# Patient Record
Sex: MALE | Race: WHITE | NOT HISPANIC OR LATINO | Employment: FULL TIME | URBAN - METROPOLITAN AREA
[De-identification: names, ages, dates, MRNs, and addresses within clinical notes are randomized per-mention and may not be internally consistent; named-entity substitution may affect disease eponyms.]

---

## 2019-09-08 ENCOUNTER — APPOINTMENT (OUTPATIENT)
Dept: CT IMAGING | Facility: CLINIC | Age: 44
End: 2019-09-08
Attending: EMERGENCY MEDICINE
Payer: COMMERCIAL

## 2019-09-08 ENCOUNTER — HOSPITAL ENCOUNTER (EMERGENCY)
Facility: CLINIC | Age: 44
Discharge: HOME | End: 2019-09-08
Attending: EMERGENCY MEDICINE | Admitting: EMERGENCY MEDICINE
Payer: COMMERCIAL

## 2019-09-08 VITALS
TEMPERATURE: 98.6 F | BODY MASS INDEX: 26.05 KG/M2 | WEIGHT: 182 LBS | HEART RATE: 68 BPM | DIASTOLIC BLOOD PRESSURE: 72 MMHG | RESPIRATION RATE: 14 BRPM | HEIGHT: 70 IN | SYSTOLIC BLOOD PRESSURE: 139 MMHG | OXYGEN SATURATION: 98 %

## 2019-09-08 DIAGNOSIS — N20.1 URETEROLITHIASIS: ICD-10-CM

## 2019-09-08 LAB
ALBUMIN UR-MCNC: 10 MG/DL
APPEARANCE UR: CLEAR
BILIRUB UR QL STRIP: NEGATIVE
CAOX CRY #/AREA URNS HPF: ABNORMAL /HPF
COLOR UR AUTO: YELLOW
GLUCOSE UR STRIP-MCNC: NEGATIVE MG/DL
HGB UR QL STRIP: ABNORMAL
KETONES UR STRIP-MCNC: 5 MG/DL
LEUKOCYTE ESTERASE UR QL STRIP: NEGATIVE
MUCOUS THREADS #/AREA URNS LPF: PRESENT /LPF
NITRATE UR QL: NEGATIVE
PH UR STRIP: 5.5 PH (ref 5–7)
RBC #/AREA URNS AUTO: >182 /HPF (ref 0–2)
SOURCE: ABNORMAL
SP GR UR STRIP: 1.03 (ref 1–1.03)
UROBILINOGEN UR STRIP-MCNC: 2 MG/DL (ref 0–2)
WBC #/AREA URNS AUTO: 2 /HPF (ref 0–5)

## 2019-09-08 PROCEDURE — 25000128 H RX IP 250 OP 636: Performed by: EMERGENCY MEDICINE

## 2019-09-08 PROCEDURE — 74176 CT ABD & PELVIS W/O CONTRAST: CPT

## 2019-09-08 PROCEDURE — 96372 THER/PROPH/DIAG INJ SC/IM: CPT

## 2019-09-08 PROCEDURE — 81001 URINALYSIS AUTO W/SCOPE: CPT | Performed by: EMERGENCY MEDICINE

## 2019-09-08 PROCEDURE — 99285 EMERGENCY DEPT VISIT HI MDM: CPT | Mod: 25

## 2019-09-08 RX ORDER — MORPHINE SULFATE 15 MG/1
15-30 TABLET ORAL EVERY 4 HOURS PRN
Qty: 10 TABLET | Refills: 0 | Status: SHIPPED | OUTPATIENT
Start: 2019-09-08 | End: 2024-02-08

## 2019-09-08 RX ADMIN — HYDROMORPHONE HYDROCHLORIDE 2 MG: 1 INJECTION, SOLUTION INTRAMUSCULAR; INTRAVENOUS; SUBCUTANEOUS at 22:16

## 2019-09-08 ASSESSMENT — ENCOUNTER SYMPTOMS
BACK PAIN: 1
DYSURIA: 1
FLANK PAIN: 1

## 2019-09-08 ASSESSMENT — MIFFLIN-ST. JEOR: SCORE: 1721.8

## 2019-09-08 NOTE — ED AVS SNAPSHOT
Emergency Department  [address] [place] [id number]  Phone:  [phone]  Fax:  [phone]                                    Christiano Dong   MRN: [id number]    Department:   Emergency Department   Date of Visit:  9/8/2019           After Visit Summary Signature Page    I have received my discharge instructions, and my questions have been answered. I have discussed any challenges I see with this plan with the nurse or doctor.    ..........................................................................................................................................  Patient/Patient Representative Signature      ..........................................................................................................................................  Patient Representative Print Name and Relationship to Patient    ..................................................               ................................................  Date                                   Time    ..........................................................................................................................................  Reviewed by Signature/Title    ...................................................              ..............................................  Date                                               Time          22EPIC Rev 08/18

## 2019-09-09 NOTE — ED PROVIDER NOTES
"  History     Chief Complaint:  Flank Pain      HPI   Christiano Dong is a 43 year old male who presents to the ED for evaluation of left sided flank pain. The patient says at around 7 PM he began feeling bloated and passed some gas. He then says he noticed intense pain like someone had hit him on his lower left back. He says that the pain is more steady now. He denies pain with urination. He says that he has never had anything like this. The patient says that laying down helps the pain the most.      Allergies:  No known drug allergies    Medications:    The patient is not currently taking any prescribed medications.    Past Medical History:    History reviewed.  No pertinent past medical history.    Past Surgical History:    History reviewed. No pertinent surgical history.    Family History:    History reviewed. No pertinent family history.     Social History:  Smoking status: Never Smoker  Alcohol use: Yes-occasionally  Marital Status:   [2]       Review of Systems   Genitourinary: Positive for dysuria and flank pain.   Musculoskeletal: Positive for back pain.   All other systems reviewed and are negative.      Physical Exam   First Vitals:  Patient Vitals for the past 24 hrs:   BP Temp Temp src Pulse Resp SpO2 Height Weight   09/08/19 2300 -- -- -- -- -- 98 % -- --   09/08/19 2148 139/72 98.6  F (37  C) Oral 68 14 99 % 1.778 m (5' 10\") 82.6 kg (182 lb)     Physical Exam  Eye:  Pupils are equal, round, and reactive.  Extraocular movements intact.    ENT:  No rhinorrhea.  Moist mucus membranes.  Normal tongue and tonsil.    Cardiac:  Regular rate and rhythm.  No murmurs, gallops, or rubs.    Pulmonary:  Clear to auscultation bilaterally.  No wheezes, rales, or rhonchi.    Abdomen:  Positive bowel sounds.  Abdomen is soft and non-distended, without focal tenderness. Despite complaints of left sided pain, palpation does not exacerbate his symptoms.     Musculoskeletal:  Normal movement of all extremities " without evidence for deficit.    Skin:  Warm and dry without rashes.    Neurologic:  Non-focal exam without asymmetric weakness or numbness.     Psychiatric:  Normal affect with appropriate interaction with examiner.    Emergency Department Course     Imaging:  Radiographic findings were communicated with the patient who voiced understanding of the findings.     Abd/pelvis CT no contrast-Stone Protocol  IMPRESSION: 1. 0.4 cm distal left ureteral calculus at the ureterovesical junction, resulting in mild obstruction. 2. No additional renal or ureteral calculi. Reading per radiology    Laboratory:  UA with micro: Urineketon 5, Urine blood Moderate, Protein Albumin Urine 10, RBC/HPF >182 (H), Mucous Urine Present, Calcium Oxalate Few o/w negative    Interventions:  2216 Dilaudid 2 mg Intramuscular     Emergency Department Course:  Past medical records, nursing notes, and vitals reviewed.  2157: I performed an exam of the patient and obtained history, as documented above.    The patient was sent for a Abd/pelvis CT while in the emergency department, findings above.    2300: I rechecked the patient. Findings and plan explained to the Patient. Patient discharged home with instructions regarding supportive care, medications, and reasons to return. The importance of close follow-up was reviewed.       Impression & Plan      Medical Decision Making:  This previously healthy middle-aged gentleman presents with a classic history and physical for ureterolithiasis.  This is confirmed on CT.  Urinalysis shows no evidence of infection.  He feels improved after the above interventions.  Plan will be for discharge home with pain medication and close outpatient follow-up.  He will return to us for worsening of condition or other emergent concerns.    Diagnosis:    ICD-10-CM    1. Ureterolithiasis N20.1        Disposition:  discharged to home    Marcus Landaverde  9/8/2019    EMERGENCY DEPARTMENT  Scribe Disclosure:  Marcus CATRER am  serving as a scribe at 9:57 PM on 9/8/2019 to document services personally performed by Trierweiler, Chad A, MD based on my observations and the provider's statements to me.        Trierweiler, Chad A, MD  09/09/19 1033

## 2024-01-30 ENCOUNTER — TELEPHONE (OUTPATIENT)
Dept: NEUROLOGY | Facility: CLINIC | Age: 49
End: 2024-01-30
Payer: COMMERCIAL

## 2024-01-30 NOTE — TELEPHONE ENCOUNTER
Writer left a message for the patient to call back to schedule an appointment wit Dr. Duran per Dr. Duran's request.    The patient called back and was scheduled on 2/8/24 at 8 AM with Dr. Duran.

## 2024-01-31 NOTE — TELEPHONE ENCOUNTER
Action 1/31/24 MV 9.38am   Action Taken Called pt and LVM to see if any additional neuro notes or imaging of brain/spine       RECORDS RECEIVED FROM: N/A   REASON FOR VISIT: PD   Date of Appt: 2/8/24   NOTES (FOR ALL VISITS) STATUS DETAILS   OFFICE NOTE from referring provider N/A **VIP REFERRAL FROM DR PAYNE - NO REFERRAL NOTES**   OFFICE NOTE from other specialist Care Everywhere Dr Pradeep Suarez @ [facility]:  1/23/24    Dr Rodger Baer @ [facility]:  10/26/23   MEDICATION LIST Care Everywhere    IMAGING  (FOR ALL VISITS)     MRI (HEAD, NECK, SPINE) N/A    CT (HEAD, NECK, SPINE) N/A

## 2024-02-08 ENCOUNTER — OFFICE VISIT (OUTPATIENT)
Dept: NEUROLOGY | Facility: CLINIC | Age: 49
End: 2024-02-08
Payer: COMMERCIAL

## 2024-02-08 ENCOUNTER — PRE VISIT (OUTPATIENT)
Dept: NEUROLOGY | Facility: CLINIC | Age: 49
End: 2024-02-08

## 2024-02-08 VITALS
DIASTOLIC BLOOD PRESSURE: 79 MMHG | SYSTOLIC BLOOD PRESSURE: 137 MMHG | WEIGHT: 178.8 LBS | BODY MASS INDEX: 25.66 KG/M2 | OXYGEN SATURATION: 99 % | HEART RATE: 80 BPM

## 2024-02-08 DIAGNOSIS — F41.9 ANXIETY: ICD-10-CM

## 2024-02-08 DIAGNOSIS — G20.A1 YOUNG-ONSET PARKINSON'S DISEASE (H): Primary | ICD-10-CM

## 2024-02-08 PROCEDURE — 99205 OFFICE O/P NEW HI 60 MIN: CPT | Performed by: PSYCHIATRY & NEUROLOGY

## 2024-02-08 PROCEDURE — G2211 COMPLEX E/M VISIT ADD ON: HCPCS | Performed by: PSYCHIATRY & NEUROLOGY

## 2024-02-08 PROCEDURE — 99417 PROLNG OP E/M EACH 15 MIN: CPT | Performed by: PSYCHIATRY & NEUROLOGY

## 2024-02-08 RX ORDER — ESCITALOPRAM OXALATE 20 MG/1
20 TABLET ORAL DAILY
COMMUNITY
Start: 2023-10-26

## 2024-02-08 RX ORDER — PRAMIPEXOLE DIHYDROCHLORIDE 0.25 MG/1
0.5 TABLET ORAL 3 TIMES DAILY
Qty: 540 TABLET | Refills: 3 | Status: SHIPPED | OUTPATIENT
Start: 2024-02-08 | End: 2024-03-01

## 2024-02-08 RX ORDER — LORAZEPAM 0.5 MG/1
0.5 TABLET ORAL
COMMUNITY
Start: 2023-10-26

## 2024-02-08 ASSESSMENT — UNIFIED PARKINSONS DISEASE RATING SCALE (UPDRS)
SPONTANEITY_OF_MOVEMENT: (0) NORMAL: NO PROBLEMS.
LEG_AGILITY_LEFT: (0) NORMAL: NO PROBLEMS.
ARISING_CHAIR: (0) NORMAL: NO PROBLEMS. ABLE TO ARISE QUICKLY WITHOUT HESITATION.
TOTAL_SCORE: 0
PRONATION_SUPINATION_LEFT: (2) MILD: ANY OF THE FOLLOWING: A) 3 TO 5 INTERRUPTIONS DURING TAPPING  B) MILD SLOWING  C) THE AMPLITUDE DECREMENTS MIDWAY IN THE 10-MOVEMENT SEQUENCE.
FREEZING_GAIT: (0) NORMAL: NO FREEZING.
AMPLITUDE_RLE: (0) NORMAL: NO TREMOR.
POSTURAL_STABILITY: (0) NORMAL: NO PROBLEMS. RECOVERS WITH ONE OR TWO STEPS.
RIGIDITY_LUE: (2) MILD: RIGIDITY DETECTED WITHOUT THE ACTIVATION MANEUVER. FULL RANGE OF MOTION IS EASILY ACHIEVED.
AXIAL_SCORE: 1
HANDMOVEMENTS_RIGHT: (0) NORMAL: NO PROBLEMS.
HANDMOVEMENTS_LEFT: (2) MILD: ANY OF THE FOLLOWING: A) 3 TO 5 INTERRUPTIONS DURING TAPPING  B) MILD SLOWING  C) THE AMPLITUDE DECREMENTS MIDWAY IN THE 10-MOVEMENT SEQUENCE.
RIGIDITY_RLE: (0) NORMAL: NO RIGIDITY.
TOETAPPING_RIGHT: (0) NORMAL: NO PROBLEMS.
TOTAL_SCORE_LEFT: 13
RIGIDITY_LLE: (1) SLIGHT: RIGIDITY ONLY DETECTED WITH ACTIVATION MANEUVER.
FACIAL_EXPRESSION: (1) SLIGHT: MINIMAL MASKED FACIES MANIFESTED ONLY BY DECREASED FREQUENCY OF BLINKING.
FINGER_TAPPING_RIGHT: (0) NORMAL: NO PROBLEMS.
TOETAPPING_LEFT: (0) NORMAL: NO PROBLEMS.
PRONATION_SUPINATION_RIGHT: (0) NORMAL: NO PROBLEMS.
LEG_AGILITY_RIGHT: (0) NORMAL: NO PROBLEMS.
AMPLITUDE_RUE: (0) NORMAL: NO TREMOR.
SPEECH: (0) NORMAL: NO SPEECH PROBLEMS.
AMPLITUDE_LLE: (1) SLIGHT: < 1 CM IN MAXIMAL AMPLITUDE.
DYSKINESIAS_PRESENT: NO
RIGIDITY_NECK: (0) NORMAL: NO RIGIDITY.
RIGIDITY_RUE: (0) NORMAL: NO RIGIDITY.
AMPLITUDE_LUE: (2) MILD: > 1 CM BUT < 3 CM IN MAXIMAL AMPLITUDE.
MOVEMENTS_INTERFERE_WITH_RATINGS: NO
GAIT: (0) NORMAL: NO PROBLEMS.
TOTAL_SCORE: 16
POSTURE: (0) NORMAL: NO PROBLEMS.
CONSTANCY_TREMOR_ATREST: (2) MILD: TREMOR AT REST IS PRESENT 25-50% OF THE ENTIRE EXAMINATION PERIOD.
PARKINSONS_MEDS: OFF
FINGER_TAPPING_LEFT: (2) MILD: ANY OF THE FOLLOWING: A) 3 TO 5 INTERRUPTIONS DURING TAPPING  B) MILD SLOWING  C) THE AMPLITUDE DECREMENTS MIDWAY IN THE 10-MOVEMENT SEQUENCE.
AMPLITUDE_LIP_JAW: (0) NORMAL: NO TREMOR.

## 2024-02-08 ASSESSMENT — PAIN SCALES - GENERAL: PAINLEVEL: NO PAIN (0)

## 2024-02-08 NOTE — PROGRESS NOTES
"Department of Neurology  Movement Disorders Division   New Patient Visit    Patient: Christiano Dong   MRN: [id number]   : 1975   Date of Visit: 2024      HPI:  Christiano Dong is a 48 year old male with a history of anxiety who presents as a new movement disorders consult for second opinion on recent Parkinson's disease diagnosis.    He saw Dr. Suarez at [facility] 24 who diagnosed him with early Parkinson's disease based on a left-sided resting tremor and bradykinesia. No diagnostics or therapeutics were ordered as the patient \"needed time to process the news.\"    He is accompanied by his sister Daniele in-person and his wife Viki over the phone.    Today, Viki notes Tank developed at least a twitch in his L thumb about 4y ago that has progressed to a more noticeable primarily resting tremor in the L hand and arm. Viki notes that Tank previously endorsed stiffness in the L palm that he massaged prior to the onset of any twitches or tremor. Tank thinks it has been significantly more noticeable in the past 6-12mo. The tremor has caused more social anxiety since his diagnosis, but otherwise does not actively interfere with activities. The tremor is not present at all times. He also feels his L leg became stiff while running ~2y ago, this has worsened in the past few months. He feels his L ring finger has decreased dexterity while typing. He feels his L shoulder is stiffer. They all deny any R-sided symptoms and head/face tremor. Daniele says he has noticed a tremor in his L leg. No toe curling or cramping.    He had a head injury  after a fall, no LOC. He feels he noticed the tremor more after this.     He endorses urinary frequency and urgency for the past few years. Denies constipation.     Sleeps well overall, nocturia 2-3x/night. No dream enactment behavior. Can be lethargic in the morning.    Has been exercising more regularly in the past few months, also made " dietary changes to decrease processed foods, sugars, caffeine, alcohol. Was feeling excellent until the diagnosis; mood has been low. Primarily worried about the new direction his life is taking.    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 Work can be intense, emotionally and mentally. Viki feels he may be more forgetful, but otherwise no cognitive changes.    They all deny changes in his voice. Denies dysphagia.     He has significant medical anxiety, does not like blood and can easily pass out.    He lives with his wife and two kids.     His paternal grandfather, uncle, and cousin were diagnosed with Parkinson's disease. His father has a diagnosis of essential tremor. The cousin was diagnosed at age 38 (not the son of the uncle with PD). Daniele has MS.    ROS:  All others negative except as listed above.    Past Medical History:   Diagnosis Date    Anxiety         No past surgical history on file.     Current Outpatient Medications   Medication Sig Dispense Refill    escitalopram (LEXAPRO) 20 MG tablet Take 20 mg by mouth daily      LORazepam (ATIVAN) 0.5 MG tablet Take 0.5 mg by mouth         No Known Allergies     Family History   Problem Relation Age of Onset    Tremor Father     Multiple Sclerosis Sister     Parkinsonism Paternal Grandfather     Parkinsonism Paternal Uncle     Parkinsonism Cousin         Social History     Socioeconomic History    Marital status:      Spouse name: Not on file    Number of children: Not on file    Years of education: 16    Highest education level: Not on file   Occupational History    Occupation: realestate developer   Tobacco Use    Smoking status: Never    Smokeless tobacco: Not on file   Substance and Sexual Activity    Alcohol use: Yes     Comment: has a glass of wine occassionally    Drug use: Not on file    Sexual activity: Not on file   Other Topics Concern    Not on file   Social History Narrative    Not on file     Social  Determinants of Health     Financial Resource Strain: Not on file   Food Insecurity: Not on file   Transportation Needs: Not on file   Physical Activity: Not on file   Stress: Not on file   Social Connections: Not on file   Interpersonal Safety: Not on file   Housing Stability: Not on file        PHYSICAL EXAM:  /79 (BP Location: Right arm, Patient Position: Sitting, Cuff Size: Adult Regular)   Pulse 80   Wt 81.1 kg (178 lb 12.8 oz)   SpO2 99%   BMI 25.66 kg/m      Mental Status:   Alert and oriented to person, place, time, and situation.  Speech fluent without paraphasic errors. Follows commands briskly.     CN:  II: Pupils equal, round, and reactive to light. VFF.  III, IV, VI: EOM normal range, no nystagmus.  Normal saccades.  V:  Facial sensation intact.  VII: Face symmetric at rest and with activation  VIII: Intact to finger rub bilaterally.  IX, X: Palate rise b/l, uvula midline.  No dysarthria.  XI: Trapezius and SCM 5/5 bilaterally.  XII: Tongue protrudes midline. No fasciculation or atrophy noted.    Motor:    Near-constant resting tremor of L hand, some postural component when bringing a bottle to his mouth.     R/L  Shoulder abd      5/5  Elbow flex  5/5  Elbow ext  5/5  Wrist flex  5/5  Wrist ext  5/5    Hip flex  5/5  Knee flex  5/5  Knee ext  5/5  Dorsiflex  5/5  Plantar flex  5/5    Reflexes:  R/L  Biceps   2+/2+  Triceps  2+/2+  Patellar  2+/2+  Achilles  2+/2+  No clonus.  No frontal release signs.    Sensation:    Intact to LT, vibration, temperature in all extremities.    Coordination:    FTN intact bilaterally.    Gait:    Normal stance and stride, decreased arm swing L>>R. Tandem gait intact.  Romberg negative.        2/8/2024     8:00 AM   UPDRS Motor Scale   Time: 08:45   Medication Off   R Brain DBS: None   L Brain DBS: None   Dyskinesia (LID) No   Did LID interfere No   Speech 0   Facial Expression 1   Rigidity Neck 0   Rigidity RUE 0   Rigidity LUE 2   Rigidity RLE 0   Rigidity  LLE 1   Finger Taps R 0   Finger Taps L 2   Hand Mvt R 0   Hand Mvt L 2   Pron-/Supinate R 0   Pron-/Supinate L 2   Toe Tap R 0   Toe Tap L 0   Leg Agility R 0   Leg Agility L 0   Arise From Chair 0   Gait 0   Gait Freezing 0   Postural Stability 0   Posture 0   Global Spont Mvt 0   Postural Tremor RUE 0   Postural Tremor LUE 1   Kinetic Tremor RUE 0   Kinetic Tremor LUE 0   Rest Tremor RUE 0   Rest Tremor LUE 2   Rest Tremor RLE 0   Rest Tremor LLE 1   Rest Tremor Lip/Jaw 0   Rest Tremor Constancy 2   Total Right 0   Total Left 13   Axial Total 1   Total 16           LABS:  Hemoglobin A1C  <=5.6 % 5.4     TSH, Sensitive  0.30 - 4.50 uIU/mL 0.71       ASSESSMENT/PLAN:  Christiano Dong is a 48 year old male with a history of anxiety who presents as a new movement disorders consult for young-onset Parkinson's disease.    The overall constellation of symptoms and exam findings are indeed supportive of early stages of a probably tremor dominant PD that is currently untreated.    - Reviewed impression and plan with patient and sister who was present in the visit    - We spent a good amount of time going over PD, education on progression, treatment options, importance of exercise, ongoing studies available at the , and resources through [facility]/[employer] he could explore. We also took the time to review all questions and concerns they had available.    - He is left-handed which is also the dominant side for his symptoms. He is very attuned to the occurrence of symptoms and he feels that once he notices them it triggers a cascade of anxiety and feeds back into the tremors.    - Because his symptoms are beyond the tremors, he also has a component of abnormal dexterity and some bradykinesia, on his dominant hand, we decided to choose a dopaminergic agent to start as a pharmacological intervention. We decided to go with pramipexole, to a target dose of 0.5mg TID to start with, after following a slow titration. We discussed  potential side effects and what to do if any. They are interested in [facility] pharmacy follow-up    - We explained the importance of early intervention with rehab, we will do an initial baseline check with all services, referrals were placed today    - He is interested in the exercises studies available, we will get the team in to talk to him about them    - We also brought in one of our PD nurses from the clinic to go over all resources available.    - We plan on regrouping in about 6-8 weeks, sooner if needed, they were encouraged to contact me back should there be questions, concerns, or changes.     I, Louie Fields MD, personally interviewed, examined and evaluated this patient.     The longitudinal plan of care for Tank was addressed during this visit. Due to the added complexity in care, I will continue to support Christiano Dong in the subsequent management of this condition(s) and with the ongoing continuity of care of this condition(s).    Attending attestation: Today a total 120 minutes were spent on this case, with greater than 50% of the time spent in face-to-face, examining, obtaining history, providing education and coordination of care. Additional time was spent in chart review, ancillary data, and documentation as delineated above.

## 2024-02-08 NOTE — PATIENT INSTRUCTIONS
Dear Tank,    After the visit today, we talked about starting a new medication called pramipexole (0.25mg)    Let's try as follows:    Weeks 1 and 2: 1/2 tablet three times a day (breakfast,lunch and dinner)    Weeks 3 and 4: 1 tablet three times a day    Weeks 5 and 6: 1.5 tablets three times a day    Week 7 until follow-up: 2 tablets three times a day

## 2024-02-08 NOTE — LETTER
"2024       RE: Christiano Dong  [address]  [facility] [address]     Dear Colleague,    Thank you for referring your patient, Christiano Dong, to the [facility]. Please see a copy of my visit note below.    Department of Neurology  Movement Disorders Division   New Patient Visit    Patient: Christiano Dong   MRN: [id number]   : 1975   Date of Visit: 2024      HPI:  Christiano Dong is a 48 year old male with a history of anxiety who presents as a new movement disorders consult for second opinion on recent Parkinson's disease diagnosis.    He saw Dr. Suarez at [facility] 24 who diagnosed him with early Parkinson's disease based on a left-sided resting tremor and bradykinesia. No diagnostics or therapeutics were ordered as the patient \"needed time to process the news.\"    He is accompanied by his sister Daniele in-person and his wife Viki over the phone.    Today, Viki notes Tank developed at least a twitch in his L thumb about 4y ago that has progressed to a more noticeable primarily resting tremor in the L hand and arm. Viki notes that Tank previously endorsed stiffness in the L palm that he massaged prior to the onset of any twitches or tremor. Tank thinks it has been significantly more noticeable in the past 6-12mo. The tremor has caused more social anxiety since his diagnosis, but otherwise does not actively interfere with activities. The tremor is not present at all times. He also feels his L leg became stiff while running ~2y ago, this has worsened in the past few months. He feels his L ring finger has decreased dexterity while typing. He feels his L shoulder is stiffer. They all deny any R-sided symptoms and head/face tremor. Daniele says he has noticed a tremor in his L leg. No toe curling or cramping.    He had a head injury  after a fall, " no LOC. He feels he noticed the tremor more after this.     He endorses urinary frequency and urgency for the past few years. Denies constipation.     Sleeps well overall, nocturia 2-3x/night. No dream enactment behavior. Can be lethargic in the morning.    Has been exercising more regularly in the past few months, also made dietary changes to decrease processed foods, sugars, caffeine, alcohol. Was feeling excellent until the diagnosis; mood has been low. Primarily worried about the new direction his life is taking.    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 Work can be intense, emotionally and mentally. Viki feels he may be more forgetful, but otherwise no cognitive changes.    They all deny changes in his voice. Denies dysphagia.     He has significant medical anxiety, does not like blood and can easily pass out.    He lives with his wife and two kids.     His paternal grandfather, uncle, and cousin were diagnosed with Parkinson's disease. His father has a diagnosis of essential tremor. The cousin was diagnosed at age 38 (not the son of the uncle with PD). Daniele has MS.    ROS:  All others negative except as listed above.    Past Medical History:   Diagnosis Date    Anxiety         No past surgical history on file.     Current Outpatient Medications   Medication Sig Dispense Refill    escitalopram (LEXAPRO) 20 MG tablet Take 20 mg by mouth daily      LORazepam (ATIVAN) 0.5 MG tablet Take 0.5 mg by mouth         No Known Allergies     Family History   Problem Relation Age of Onset    Tremor Father     Multiple Sclerosis Sister     Parkinsonism Paternal Grandfather     Parkinsonism Paternal Uncle     Parkinsonism Cousin         Social History     Socioeconomic History    Marital status:      Spouse name: Not on file    Number of children: Not on file    Years of education: 16    Highest education level: Not on file   Occupational History    Occupation: realestate  developer   Tobacco Use    Smoking status: Never    Smokeless tobacco: Not on file   Substance and Sexual Activity    Alcohol use: Yes     Comment: has a glass of wine occassionally    Drug use: Not on file    Sexual activity: Not on file   Other Topics Concern    Not on file   Social History Narrative    Not on file     Social Determinants of Health     Financial Resource Strain: Not on file   Food Insecurity: Not on file   Transportation Needs: Not on file   Physical Activity: Not on file   Stress: Not on file   Social Connections: Not on file   Interpersonal Safety: Not on file   Housing Stability: Not on file        PHYSICAL EXAM:  /79 (BP Location: Right arm, Patient Position: Sitting, Cuff Size: Adult Regular)   Pulse 80   Wt 81.1 kg (178 lb 12.8 oz)   SpO2 99%   BMI 25.66 kg/m      Mental Status:   Alert and oriented to person, place, time, and situation.  Speech fluent without paraphasic errors. Follows commands briskly.     CN:  II: Pupils equal, round, and reactive to light. VFF.  III, IV, VI: EOM normal range, no nystagmus.  Normal saccades.  V:  Facial sensation intact.  VII: Face symmetric at rest and with activation  VIII: Intact to finger rub bilaterally.  IX, X: Palate rise b/l, uvula midline.  No dysarthria.  XI: Trapezius and SCM 5/5 bilaterally.  XII: Tongue protrudes midline. No fasciculation or atrophy noted.    Motor:    Near-constant resting tremor of L hand, some postural component when bringing a bottle to his mouth.     R/L  Shoulder abd      5/5  Elbow flex  5/5  Elbow ext  5/5  Wrist flex  5/5  Wrist ext  5/5    Hip flex  5/5  Knee flex  5/5  Knee ext  5/5  Dorsiflex  5/5  Plantar flex  5/5    Reflexes:  R/L  Biceps   2+/2+  Triceps  2+/2+  Patellar  2+/2+  Achilles  2+/2+  No clonus.  No frontal release signs.    Sensation:    Intact to LT, vibration, temperature in all extremities.    Coordination:    FTN intact bilaterally.    Gait:    Normal stance and stride, decreased arm  swing L>>R. Tandem gait intact.  Romberg negative.        2/8/2024     8:00 AM   UPDRS Motor Scale   Time: 08:45   Medication Off   R Brain DBS: None   L Brain DBS: None   Dyskinesia (LID) No   Did LID interfere No   Speech 0   Facial Expression 1   Rigidity Neck 0   Rigidity RUE 0   Rigidity LUE 2   Rigidity RLE 0   Rigidity LLE 1   Finger Taps R 0   Finger Taps L 2   Hand Mvt R 0   Hand Mvt L 2   Pron-/Supinate R 0   Pron-/Supinate L 2   Toe Tap R 0   Toe Tap L 0   Leg Agility R 0   Leg Agility L 0   Arise From Chair 0   Gait 0   Gait Freezing 0   Postural Stability 0   Posture 0   Global Spont Mvt 0   Postural Tremor RUE 0   Postural Tremor LUE 1   Kinetic Tremor RUE 0   Kinetic Tremor LUE 0   Rest Tremor RUE 0   Rest Tremor LUE 2   Rest Tremor RLE 0   Rest Tremor LLE 1   Rest Tremor Lip/Jaw 0   Rest Tremor Constancy 2   Total Right 0   Total Left 13   Axial Total 1   Total 16           LABS:  Hemoglobin A1C  <=5.6 % 5.4     TSH, Sensitive  0.30 - 4.50 uIU/mL 0.71       ASSESSMENT/PLAN:  Christiano Dong is a 48 year old male with a history of anxiety who presents as a new movement disorders consult for young-onset Parkinson's disease.    The overall constellation of symptoms and exam findings are indeed supportive of early stages of a probably tremor dominant PD that is currently untreated.    - Reviewed impression and plan with patient and sister who was present in the visit    - We spent a good amount of time going over PD, education on progression, treatment options, importance of exercise, ongoing studies available at the , and resources through [facility] he could explore. We also took the time to review all questions and concerns they had available.    - He is left-handed which is also the dominant side for his symptoms. He is very attuned to the occurrence of symptoms and he feels that once he notices them it triggers a cascade of anxiety and feeds back into the tremors.    - Because his symptoms are  beyond the tremors, he also has a component of abnormal dexterity and some bradykinesia, on his dominant hand, we decided to choose a dopaminergic agent to start as a pharmacological intervention. We decided to go with pramipexole, to a target dose of 0.5mg TID to start with, after following a slow titration. We discussed potential side effects and what to do if any. They are interested in MTM pharmacy follow-up    - We explained the importance of early intervention with rehab, we will do an initial baseline check with all services, referrals were placed today    - He is interested in the exercises studies available, we will get the team in to talk to him about them    - We also brought in one of our PD nurses from the clinic to go over all resources available.    - We plan on regrouping in about 6-8 weeks, sooner if needed, they were encouraged to contact me back should there be questions, concerns, or changes.     I, Louie Fields MD, personally interviewed, examined and evaluated this patient.     The longitudinal plan of care for Tank was addressed during this visit. Due to the added complexity in care, I will continue to support Christiano Dong in the subsequent management of this condition(s) and with the ongoing continuity of care of this condition(s).    Attending attestation: Today a total 120 minutes were spent on this case, with greater than 50% of the time spent in face-to-face, examining, obtaining history, providing education and coordination of care. Additional time was spent in chart review, ancillary data, and documentation as delineated above.        Again, thank you for allowing me to participate in the care of your patient.      Sincerely,    Louie Fields MD

## 2024-02-09 ENCOUNTER — TELEPHONE (OUTPATIENT)
Dept: NEUROLOGY | Facility: CLINIC | Age: 49
End: 2024-02-09
Payer: COMMERCIAL

## 2024-02-09 NOTE — TELEPHONE ENCOUNTER
GENETIC COUNSELING-Neurology  Tank was referred due to young onset Parkinson disease. I left a message offering video visit times and asked him to call me back.    Sincere Mcneil MS, [facility]  Certified Genetic Counselor

## 2024-02-15 ENCOUNTER — VIRTUAL VISIT (OUTPATIENT)
Dept: NEUROLOGY | Facility: CLINIC | Age: 49
End: 2024-02-15
Attending: PSYCHIATRY & NEUROLOGY
Payer: COMMERCIAL

## 2024-02-15 DIAGNOSIS — G20.A1 YOUNG-ONSET PARKINSON'S DISEASE (H): Primary | ICD-10-CM

## 2024-02-15 DIAGNOSIS — F41.1 GAD (GENERALIZED ANXIETY DISORDER): ICD-10-CM

## 2024-02-15 NOTE — PATIENT INSTRUCTIONS
"Recommendations from today's MTM visit:                                                    MTM (medication therapy management) is a service provided by a clinical pharmacist designed to help you get the most of out of your medicines.         -No medication changes today.   -Ok to take Vitamin D 1000 units daily if you want. Look for supplements with the indication of GMP (Good Manufacturing Practices). Two brands are OrthoMolecular and Pure Encapsulations, though these are certainly not the only ones.  -I will ask Dr. Duran about prescription for lorazepam prior to upcoming blood draw.     Follow-up: 3/1/24 at 11am    It was great speaking with you today.  I value your experience and would be very thankful for your time in providing feedback in our clinic survey. In the next few days, you may receive an email or text message from [employer] with a link to a survey related to your  clinical pharmacist.\"     To schedule another MTM appointment, please call the clinic directly or you may call the MTM scheduling line at [phone].    My Clinical Pharmacist's contact information:                                                      Please feel free to contact me with any questions or concerns you have.      Michelle Feng, PharmD  Medication Therapy Management Pharmacist  [facility]    "

## 2024-02-15 NOTE — PROGRESS NOTES
Medication Therapy Management (MTM) Encounter    ASSESSMENT:                            Medication Adherence/Access: No issues identified    Parkinson's Disease:   Tolerating medication well thus far. Will plan to continue current dose and follow up in 2 weeks for further titration, if appropriate at that time. If further increase is desired, would consider increasing by 0.25-0.5mg at one dose at a time for slow titration. Discussed watching for changes from his baseline for both improvement and side effects, as he preferred not reviewing specific possible side effects.  Answered questions about nutrition and supplements and noted that there are no specific diets or supplement recommendations for general PD. He already seems to have a healthy, balanced diet with adequate omega 3. No Vitamin D level on file, though could consider a 1000 unit daily supplement if he wanted to take something. Discussed quality supplements and looking for GMP (Good Manufacturing Practices).    Anxiety:    Doing well overall. Would benefit from lorazepam tablet for upcoming blood draw.    PLAN:                            -No medication changes today.   -Ok to take Vitamin D 1000 units daily if you want. Look for supplements with the indication of GMP (Good Manufacturing Practices). Two brands are OrthoMolecular and Pure Encapsulations, though these are certainly not the only ones.  -Will ask Dr. Duran about prescription for lorazepam prior to upcoming blood draw.    Follow-up: 3/1/24    SUBJECTIVE/OBJECTIVE:                          Tank Dong is a 48 year old male called for an initial visit. He was referred to me from Dr. Duran, Neurology.    Reason for visit: med review.    Allergies/ADRs: Reviewed in chart  Past Medical History: Reviewed in chart  Tobacco: He reports that he has never smoked. He does not have any smokeless tobacco history on file.  Alcohol: not currently using    Medication Adherence/Access: no issues  "reported    Parkinson's Disease:   -pramipexole 0.5mg TID (x 6 days)    Patient recently saw Neurology on 2/8, who confirmed recent diagnosis of young-onset Parkinson's Disease.   See that note for full detail, but he does currently notice resting tremor in left arm and hand, though not always present. He does get anxious about it in social situations, which in turn significantly increases the tremor. Also notices some stiffness in left leg when running and also in his left shoulder.  Thus far, he has not noticed any improvement or side effects since starting pramipexole.    Anxiety:    -escitalopram 20mg daily  -lorazepam 0.5mg as needed (before blood draws and other procedures)-no current supply; has taken about one tablet per year    Pt has been taking escitalopram for about 6 years and has been quite helpful. He reported doing quite well, in combination with healthy diet and exercise. He had much worse anxiety after recent PD diagnosis, but is already feeling much better. States that he does tend to get anxious about medical things and prefers to \"just be told what to do\" rather than knowing a lot of information about potential side effects, etc.  He does have upcoming blood draw for genetic testing and wonders about getting a lorazepam tablet.    ----------------    I spent 30 minutes with this patient today. A copy of the visit note was provided to the patient's provider(s).    A summary of these recommendations was sent via clinic portal.    Michelle Feng, PharmD  Medication Therapy Management Pharmacist  [facility] Neu[facility] Clin[facility]    Telemedicine Visit Details  Type of service:  Telephone visit  Start Time:  9:00am  End Time:  9:30am     Medication Therapy Recommendations  No medication therapy recommendations to display   "

## 2024-02-15 NOTE — Clinical Note
Christiano Griffin, Had a very nice visit with this patient today. He's feeling much less anxious with new diagnosis in the last week. I saw in your note that you were starting pramipexole to a goal of 0.5mg TID, though it looks like that was on the Rx, so he started with that dose right away, now about one week ago. He seems to be tolerating well without issue, so I just asked him to continue on that dose for a few weeks longer. He wonders about getting a lorazepam tablet for upcoming blood draw for genetic testing with Sincere Mcneil. He has had that before. Would you be ok prescribing this?  -lobo

## 2024-02-15 NOTE — Clinical Note
2/15/2024       RE: Christiano Dong  [address]  [facility] [address]     Dear Colleague,    Thank you for referring your patient, Christiano Dong, to the [facility] MULTIPLE [facility]EROSIS CLINIC [place] at [facility]. Please see a copy of my visit note below.    Medication Therapy Management (MTM) Encounter    ASSESSMENT:                            Medication Adherence/Access: No issues identified    Parkinson's Disease:   Tolerating medication well thus far. Will plan to continue current dose and follow up in 2 weeks for further titration, if appropriate at that time.   Answered questions about nutrition and supplements and noted that there are no specific diets or supplement recommendations for PD. He already seems to have a healthy, balanced diet with adequate omega 3. No Vitamin D level on file, though could consider a 1000 unit daily supplement if he wanted to take something. Discussed quality supplements and looking for GMP (Good Manufacturing Practices).    Anxiety:    Doing well overall. Would benefit from lorazepam tablet for upcoming blood draw.    PLAN:                            -No medication changes today.   -Ok to take Vitamin D 1000 units daily if you want  -Will ask Dr. Duran about prescription for lorazepam prior to upcoming blood draw.    Follow-up: 3/1/24    SUBJECTIVE/OBJECTIVE:                          Tank Dong is a 48 year old male called for an initial visit. He was referred to me from Dr. Duran, Neurology.    Reason for visit: med review.    Allergies/ADRs: Reviewed in chart  Past Medical History: Reviewed in chart  Tobacco: He reports that he has never smoked. He does not have any smokeless tobacco history on file.  Alcohol: not currently using    Medication Adherence/Access: no issues reported    Parkinson's Disease:   -pramipexole 0.5mg TID (x 6 days)    Patient recently saw Neurology on 2/8, who confirmed  "recent diagnosis of young-onset Parkinson's Disease.   See that note for full detail, but he does currently notice resting tremor in left arm and hand, though not always present. He does get anxious about it in social situations, which in turn significantly increases the tremor. Also notices some stiffness in left leg when running and also in his left shoulder.  Thus far, he has not noticed any improvement or side effects since starting pramipexole.    Anxiety:    -escitalopram 20mg daily  -lorazepam 0.5mg as needed (before blood draws and other procedures)-no current supply; has taken about one tablet per year    Pt has been taking escitalopram for about 6 years and has been quite helpful. He reported doing quite well, in combination with healthy diet and exercise. He had much worse anxiety after recent PD diagnosis, but is already feeling much better. States that he does tend to get anxious about medical things and prefers to \"just be told what to do\" rather than knowing a lot of information about potential side effects, etc.  He does have upcoming blood draw for genetic testing and wonders about getting a lorazepam tablet.    ----------------    I spent 30 minutes with this patient today. A copy of the visit note was provided to the patient's provider(s).    A summary of these recommendations was sent via clinic portal.    Michelle Feng PharmD  Medication Therapy Management Pharmacist  [facility]      Telemedicine Visit Details  Type of service:  Telephone visit  Start Time:  9:00am  End Time:  9:30am     Medication Therapy Recommendations  No medication therapy recommendations to display       Again, thank you for allowing me to participate in the care of your patient.      Sincerely,    Michelle Feng PharmD    "

## 2024-02-19 DIAGNOSIS — F41.1 GAD (GENERALIZED ANXIETY DISORDER): Primary | ICD-10-CM

## 2024-02-19 DIAGNOSIS — G20.A1 YOUNG-ONSET PARKINSON'S DISEASE (H): ICD-10-CM

## 2024-02-19 RX ORDER — LORAZEPAM 0.5 MG/1
0.5 TABLET ORAL ONCE
Qty: 1 TABLET | Refills: 0 | Status: SHIPPED | OUTPATIENT
Start: 2024-02-19 | End: 2024-02-19

## 2024-02-29 NOTE — PROGRESS NOTES
"Medication Therapy Management (MTM) Encounter    ASSESSMENT:                            Medication Adherence/Access: No issues identified    Parkinson's Disease:   Tremor has been better lately, though maybe moreso related to improved anxiety since hearing PD diagnosis about one month ago. Reasonable to continue titrating medication today. He has follow up with Neurology in about 3 weeks.    PLAN:                            -increase pramipexole dose slowly   -Week 1: 0.75mg (3 tabs) in the morning, 0.5mg (2 tabs) in the afternoon, 0.5mg (2 tabs in the evening)   -Week 2: 0.75mg (3 tabs) in the morning, 0.75mg (3 tabs) in the afternoon, 0.5mg (2 tabs in the evening)   -Week 3: 0.75mg (3 tabs) in the morning, 0.75mg (3 tabs) in the afternoon, 0.75mg (3 tabs in the evening)    Follow-up: 2-3 months or sooner if needed  Neurology on 3/25    SUBJECTIVE/OBJECTIVE:                          Tank Dong is a 48 year old male called for a follow-up visit from 2/15/24.       Reason for visit: med check.    Allergies/ADRs: Reviewed in chart  Past Medical History: Reviewed in chart  Tobacco: He reports that he has never smoked. He does not have any smokeless tobacco history on file.  Alcohol: not currently using    Medication Adherence/Access: no issues reported    Parkinson's Disease:   -pramipexole 0.5mg TID     Patient has been taking medication for about a month and is still not sure if medication has made much difference. He thinks it is maybe slightly better, but anxiety had also been very high after learning of the PD diagnosis and \"mental side is calmed down.\" Back to baseline.  Tremor does come and go, maybe present about 50% of the time. Probably most bothersome when typing and when self conscious about it in meetings with people.   He is maybe a little more tired in the afternoons, though not sure med related.  No clear increase in lightheadedness, though did notice some when exercising on one occasion. Doesn't " think related.  Denied any nausea or constipation.     ----------------      I spent 15 minutes with this patient today. All changes were made via collaborative practice agreement with Louie Aguirre. A copy of the visit note was provided to the patient's provider(s).    A summary of these recommendations was sent via [employer].    Michelle Feng, PharmD  Medication Therapy Management Pharmacist  [facility] [place] [facility]      Telemedicine Visit Details  Type of service:  Telephone visit  Start Time:  11:10am  End Time:  11:25am     Medication Therapy Recommendations  Young-onset Parkinson's disease    Current Medication: pramipexole (MIRAPEX) 0.25 MG tablet (Discontinued)   Rationale: Dose too low - Dosage too low - Effectiveness   Recommendation: Increase Dose - per plan   Status: Accepted per CPA

## 2024-03-01 ENCOUNTER — VIRTUAL VISIT (OUTPATIENT)
Dept: NEUROLOGY | Facility: CLINIC | Age: 49
End: 2024-03-01
Attending: PSYCHIATRY & NEUROLOGY
Payer: COMMERCIAL

## 2024-03-01 DIAGNOSIS — G20.A1 YOUNG-ONSET PARKINSON'S DISEASE (H): ICD-10-CM

## 2024-03-01 RX ORDER — PRAMIPEXOLE DIHYDROCHLORIDE 0.25 MG/1
TABLET ORAL
Qty: 270 TABLET | Refills: 3 | Status: SHIPPED | OUTPATIENT
Start: 2024-03-01 | End: 2024-09-01

## 2024-03-01 NOTE — Clinical Note
3/1/2024       RE: Christiano Dong  [address]  [facility] [address]     Dear Colleague,    Thank you for referring your patient, Christiano Dong, to the [facility] [place] [facility]. Please see a copy of my visit note below.    Medication Therapy Management (MTM) Encounter    ASSESSMENT:                            Medication Adherence/Access: {adherencechoices:934061}    ***:   ***    PLAN:                            ***    Follow-up: {followuptest2:879728}    SUBJECTIVE/OBJECTIVE:                          Tank Dong is a 48 year old male { :843983} for {mtmvisit:973048}     Reason for visit: ***.    Allergies/ADRs: {1/2/3/4/5:364698}  Past Medical History: {1/2/3/4/5:795840}  Tobacco: He reports that he has never smoked. He does not have any smokeless tobacco history on file.  Alcohol: {ALCOHOL CONSUMPTION HX:420359}  {Social and Goals:541673}  Medication Adherence/Access: {fumedadherence:366462}    {MTM SUBJECTIVE (Optional):109544}        ***:   ***    Parkinson's Disease:   -pramipexole 0.5mg TID (taking x 3 weeks)     Patient recently saw Neurology on 2/8, who confirmed recent diagnosis of young-onset Parkinson's Disease.   See that note for full detail, but he does currently notice resting tremor in left arm and hand, though not always present. He does get anxious about it in social situations, which in turn significantly increases the tremor. Also notices some stiffness in left leg when running and also in his left shoulder.  Thus far, he has not noticed any improvement or side effects since starting pramipexole.          Tolerating medication well thus far. Will plan to continue current dose and follow up in 2 weeks for further titration, if appropriate at that time. If further increase is desired, would consider increasing by 0.25-0.5mg at one dose at a time for slow titration.     Today's Vitals:  "There were no vitals taken for this visit.  ----------------  {ISSA?:458648}    I spent {mtm total time 3:638688} with this patient today{MTMpartdbillingquestion:357280}. { :424031}. A copy of the visit note was provided to the patient's provider(s).    A summary of these recommendations {GIVEN/NOT GIVEN:914936}.    ***    Telemedicine Visit Details  Type of service:  {telemedvisitmtm:564837::\"Telephone visit\"}  Start Time: {video/phone visit start time:152948}  End Time: {video/phone visit end time:152948}     Medication Therapy Recommendations  No medication therapy recommendations to display     Medication Therapy Management (MTM) Encounter    ASSESSMENT:                            Medication Adherence/Access: No issues identified    Parkinson's Disease:   Tremor has been better lately, though maybe moreso related to improved anxiety since hearing PD diagnosis about one month ago. Reasonable to continue titrating medication today. He has follow up with Neurology in about 3 weeks.    PLAN:                            -increase pramipexole dose slowly   -Week 1: 0.75mg (3 tabs) in the morning, 0.5mg (2 tabs) in the afternoon, 0.5mg (2 tabs in the evening)   -Week 2: 0.75mg (3 tabs) in the morning, 0.75mg (3 tabs) in the afternoon, 0.5mg (2 tabs in the evening)   -Week 3: 0.75mg (3 tabs) in the morning, 0.75mg (3 tabs) in the afternoon, 0.75mg (3 tabs in the evening)    Follow-up: 2-3 months or sooner if needed  Neurology on 3/25    SUBJECTIVE/OBJECTIVE:                          Tank Dong is a 48 year old male called for a follow-up visit from 2/15/24.       Reason for visit: med check.    Allergies/ADRs: Reviewed in chart  Past Medical History: Reviewed in chart  Tobacco: He reports that he has never smoked. He does not have any smokeless tobacco history on file.  Alcohol: not currently using    Medication Adherence/Access: no issues reported    Parkinson's Disease:   -pramipexole 0.5mg TID     Patient has been " "taking medication for about a month and is still not sure if medication has made much difference. He thinks it is maybe slightly better, but anxiety had also been very high after learning of the PD diagnosis and \"mental side is calmed down.\" Back to baseline.  Tremor does come and go, maybe present about 50% of the time. Probably most bothersome when typing and when self conscious about it in meetings with people.   He is maybe a little more tired in the afternoons, though not sure med related.  No clear increase in lightheadedness, though did notice some when exercising on one occasion. Doesn't think related.  Denied any nausea or constipation.     ----------------  {ISSA?:541794}    I spent {[facility] total time 3:893132} with this patient today{MTMpartdbillingquestion:224352}. { :264652}. A copy of the visit note was provided to the patient's provider(s).    A summary of these recommendations {GIVEN/NOT GIVEN:467449}.    ***    Telemedicine Visit Details  Type of service:  {[facility]visit[facility]:321704::\"Telephone visit\"}  Start Time: {video/phone visit start time:152948}  End Time: {video/phone visit end time:152948}     Medication Therapy Recommendations  No medication therapy recommendations to display       Again, thank you for allowing me to participate in the care of your patient.      Sincerely,    Michelle Feng, PharmD    "

## 2024-03-01 NOTE — Clinical Note
He started pramipexole at 0.5mg TID (unsure if titration instructions were missed or what happened), but has tolerated ok. Bumping up slightly on a weekly basis until he sees you in 3 weeks.

## 2024-03-01 NOTE — PATIENT INSTRUCTIONS
"Recommendations from today's MTM visit:                                                    MTM (medication therapy management) is a service provided by a clinical pharmacist designed to help you get the most of out of your medicines.         -increase pramipexole dose slowly   -Week 1: 0.75mg (3 tabs) in the morning, 0.5mg (2 tabs) in the afternoon, 0.5mg (2 tabs in the evening)   -Week 2: 0.75mg (3 tabs) in the morning, 0.75mg (3 tabs) in the afternoon, 0.5mg (2 tabs in the evening)   -Week 3: 0.75mg (3 tabs) in the morning, 0.75mg (3 tabs) in the afternoon, 0.75mg (3 tabs in the evening)    Follow-up: 2-3 months or sooner if needed  Neurology on 3/25    It was great speaking with you today.  I value your experience and would be very thankful for your time in providing feedback in our clinic survey. In the next few days, you may receive an email or text message from [employer] with a link to a survey related to your  clinical pharmacist.\"     To schedule another MTM appointment, please call the clinic directly or you may call the MTM scheduling line at [phone].    My Clinical Pharmacist's contact information:                                                      Please feel free to contact me with any questions or concerns you have.      Michelle Feng, PharmD  Medication Therapy Management Pharmacist  [facility]    "

## 2024-03-04 ENCOUNTER — THERAPY VISIT (OUTPATIENT)
Dept: PHYSICAL THERAPY | Facility: CLINIC | Age: 49
End: 2024-03-04
Attending: INTERNAL MEDICINE
Payer: COMMERCIAL

## 2024-03-04 ENCOUNTER — THERAPY VISIT (OUTPATIENT)
Dept: SPEECH THERAPY | Facility: CLINIC | Age: 49
End: 2024-03-04
Attending: PSYCHIATRY & NEUROLOGY
Payer: COMMERCIAL

## 2024-03-04 ENCOUNTER — THERAPY VISIT (OUTPATIENT)
Dept: OCCUPATIONAL THERAPY | Facility: CLINIC | Age: 49
End: 2024-03-04
Attending: INTERNAL MEDICINE
Payer: COMMERCIAL

## 2024-03-04 DIAGNOSIS — G20.A1 YOUNG-ONSET PARKINSON'S DISEASE (H): Primary | ICD-10-CM

## 2024-03-04 DIAGNOSIS — G20.A1 PRIMARY PARKINSON'S DISEASE (H): Primary | ICD-10-CM

## 2024-03-04 DIAGNOSIS — R49.8 HYPOPHONIA: ICD-10-CM

## 2024-03-04 DIAGNOSIS — R27.8 COORDINATION IMPAIRMENT: ICD-10-CM

## 2024-03-04 DIAGNOSIS — R27.9 INCOORDINATION: ICD-10-CM

## 2024-03-04 DIAGNOSIS — R26.89 IMPAIRMENT OF BALANCE: ICD-10-CM

## 2024-03-04 PROCEDURE — 97535 SELF CARE MNGMENT TRAINING: CPT | Mod: GO

## 2024-03-04 PROCEDURE — 97165 OT EVAL LOW COMPLEX 30 MIN: CPT | Mod: GO

## 2024-03-04 PROCEDURE — 92524 BEHAVRAL QUALIT ANALYS VOICE: CPT | Mod: GN

## 2024-03-04 PROCEDURE — 97161 PT EVAL LOW COMPLEX 20 MIN: CPT | Mod: GP | Performed by: PHYSICAL THERAPIST

## 2024-03-04 PROCEDURE — 97110 THERAPEUTIC EXERCISES: CPT | Mod: GP | Performed by: PHYSICAL THERAPIST

## 2024-03-04 NOTE — PROGRESS NOTES
"SPEECH LANGUAGE PATHOLOGY EVALUATION    [facility]  OUTPATIENT SPEECH LANGUAGE PATHOLOGY EVALUATION    See electronic medical record for Abuse and Falls Screening details.    Subjective      Presenting condition or subjective complaint: Left hand dexterity.  Tremor in left hand.  Glitch/flexibility in left hip  Mr. Alvarado \"Tank\" Letitia is an pleasant 48 year old male who was referred to outpatient speech language pathology for dysphonia, dysphagia, and dysarthria by neurologist. Tank endorsing that sometimes people ask him to repeat what he said previously again. Further endorsed that the typically using his voice~60% of day as he is on the phone a lot for work. Declines running out breath during speech. Declines difficulty taking a deep breath. Declines voice feeling fatigued at the end of the day. Declined notice of voice reducing in loudness. Declines dysarthria. Declines dysphagia. Declines memory difficulty.   Date of onset: 02/08/24    Diagnosis: Parkinson's disease    Date of diagnosis: 1/23/24  Patient's Neurologist: Louie Aguirre MD    Date of last Neurologist visit: 2/8/2024   First movement disorder symptom presentation and Date: began experiencing L hand/thumb tremors about 3-4 years ago  Non-motor symptoms: Lmild fatigue which improves with exercise, anxiety, urinary symptoms.    Time of last PD med: 7:30 Time of next PD med: 1200. On/Off presentation/symptoms: none.   History of PD specific SLP? none  Date of onset: 02/08/24    Relevant medical history: Depression   Active Ambulatory Problems     Diagnosis Date Noted    Young-onset Parkinson's disease 02/08/2024     Resolved Ambulatory Problems     Diagnosis Date Noted    No Resolved Ambulatory Problems     Past Medical History:   Diagnosis Date    Anxiety       Dates & types of surgery: Right achilles almost 20 years ago  No past surgical history on file.   Prior diagnostic imaging/testing results: MRI  "    Prior therapy history for the same diagnosis, illness or injury: No      Living Environment  Social support: With family members   Help at home: None  Equipment owned:       Employment: Yes [employer]   Hobbies/Interests: Running    Patient goals for therapy: No    Pain assessment: Pain denied     Objective   PERSONAL RATING/VOICE USE RATING  Patient description of current voice quality: Pt reports that today is a typical day for his voice and speech symptoms.      ACOUSTIC MEASURES:  Equipment use for testing: Sound level meter at 30 cm distance from microphone to mouth     Maximum sustained phonation:     Prolonged 'ah' at mid pitch (sec): 73.8 dB SPL (g2) for an average of 5.98  seconds with minimal roughness and tremulousness (improves to 80 dB SPL for average of 11.54 seconds w/ cues  to think loud)        Vibratory Function of Vocal Folds Scale Norms: males 20 - 80 yrs: 15 - 25 secs   Average Fundamental Frequency: 160 Hz (centered around D3) with monotone intonation       Reading: paragraph level reading loudness in dB SPL: 75 dB  Discourse: Conversation/Monologue level:     Discourse loudness in dB SPL 73 dB     Stimulability - loudness in dB SPL: 80 dB      PERCEPTUAL EVALUATION  Voice quality:  rare minimal roughness and breathiness  PROBES: Improved voice quality and volume with probe to 'think loud'.   Breath control: Weak - inspirations for speech are shallow with poor respiratory/phonatory coordination   Voice Use/Effort: hypophonic, pt reports minimal vocal effort during phonation  Neuromuscular Control: shaky, mild intermittent tremulousness observed  Speech Intelligibility: Speech is 100% intelligible at the conversation level. Labial/lingual AMRS are WNL. Labial/lingual SMRs are mildly imprecise in articulation.    Assessment & Plan   CLINICAL IMPRESSIONS   Medical Diagnosis: Young-onset Parkinson's disease (G20.A1)  - Primary    Treatment Diagnosis: minimal hypophonia    Impression/Assessment: Pt is a 48 year old male with voice/speech complaints. The following significant findings have been identified: impaired voicing, characterized by reduced volume, breathiness, roughness, monotone intonation, intermittent tremulousness, and weak breath control. Identified deficits interfere with their ability to  maintain vocal loudness and voice quality for functional communication, so that pt is able to be heard and understood by others in daily conversations  as compared to previous level of function.     PLAN OF CARE  Treatment Interventions:  Created cohesive and individualized treatment/POC w/ pt, and interdisplinary team. Pt stated comprehension of evaluation results, recommendations, and POC    Prognosis to achieve stated therapy goals is good   Rehab potential is impacted by: comorbidities    Long Term Goals   SLP Goal 1  Goal Identifier: Breath  Goal Description: Patient will increase vocal loudness to an average SPL of >75 dB at 30 cm during sustained /a/ phonation of at least 15 seconds given min cues in order to increase vocal respiratory support required for functional communication.  Target Date: 06/02/24  SLP Goal 2  Goal Identifier: Loudness  Goal Description: Patient will increase vocal loudness during narrative reading and semi-spontaneous conversation tasks to a minimum of  75 dB at 30 cm given min cues in order to improve vocal loudness for daily communication tasks  Target Date: 06/02/24      Frequency of Treatment: x1/wk for 4 weeks, tapering off to 1-2/xmonth  Duration of Treatment: up to 90 days     Recommended Referrals to Other Professionals: Occupational Therapy, Physical Therapy, pt already seeing OT/PT  Education Assessment:   Learner/Method: Patient;Listening;Demonstration;Pictures/Video  Education Comments: Created cohesive and individualized treatment/POC w/ pt, and interdisplinary team. Pt stated comprehension of evaluation results, recommendations, and  "POC    Risks and benefits of evaluation/treatment have been explained.   Patient/Family/caregiver agrees with Plan of Care.     Evaluation Time:    Voice Minutes (19310): 40     Present: Not applicable     Thank you for referring Christiano \"Tank\" Letitia to outpatient speech therapy at [facility].  Please call [phone] or email  Juany Avila MS, CCC-SLP at [email or web address] with any questions or concerns.      Juany Avila MS, CCC-SLP    Speech-Language Pathologist     "

## 2024-03-04 NOTE — PROGRESS NOTES
OCCUPATIONAL THERAPY EVALUATION  Type of Visit: Evaluation    See electronic medical record for Abuse and Falls Screening details.    Subjective      Presenting condition or subjective complaint: Left hand dexterity.  Tremor in left hand.  Glitch/flexibility in left hip  Date of onset: 02/08/24 (order date)    Relevant medical history: Depression   Dates & types of surgery: Right achilles almost 20 years ago    Prior diagnostic imaging/testing results: MRI     Prior therapy history for the same diagnosis, illness or injury: No      Occupational Profile: Patient is an 48 year old left hand dominant male was referred to outpatient occupational therapy in to setting of recent diagnosis of young-onset Parkinson's disease.    First movement disorder symptom presentation and Date: noticed a twitch in his L thumb about 4y ago that has progressed to a more noticeable primarily resting tremor in the L hand; feels his L leg became stiff while running ~2y ago, this has worsened in the past few months.   Patient's Neurologist: Louie Aguirre MD    Medication for movement management: pramipexole, to a target dose of 0.5mg TID to start with, after following a slow titration per MD notes  Time of last PD med: 730 am Time of next PD med: 12. On/Off presentation/symptoms: none  Motor symptoms: tremor (left), bradykinesia, hyokinesia (decreased arm swing), rigidity, decreased dexterity.   Non-motor symptoms: mild fatigue which improves with exercise, some social anxiety since his diagnosis, urinary frequency and urgency  History of PD specific PT/OT? No.    Current Activity Level:  he has been exercising more regularly in the past few months. Enjoys football with his son  Functional limitations: decreased left hand dexterity due to left hand tremor impacting FMC tasks (ie: he feels his L ring finger has decreased dexterity while typing); writes right handed without notable micrographia; left shoulder tightness which can  impact throwing football with son  Falls history: Fall in March 2023        Prior Level of Function  Transfers: Independent  Ambulation: Independent  ADL: Independent  IADL: Driving, Finances, Housekeeping, Laundry, Meal preparation, Medication management, Work    Living Environment  Social support: With family members ; Lives with his wife and two children ( his daughter freshman and son in 6th grade)  Type of home: House   Stairs to enter the home: Yes 2 Is there a railing: Yes   Ramp: No   Stairs inside the home: Yes 17 Is there a railing: Yes   Help at home: None  Equipment owned:       Employment: Yes [employer]  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   Hobbies/Interests: Running    Patient goals for therapy: No    Pain assessment:  mild stiffness in left shoulder and hand     Objective   Cognitive Status Examination  Orientation: Oriented to person, place and time   Level of Consciousness: Alert  Follows Commands and Answers Questions: 100% of the time  Personal Safety and Judgement: Intact  Memory: Intact  Attention: No deficits identified  Organization/Problem Solving: No deficits identified  Executive Function: No deficits identified    Denies issues with cognition     Trail Making Part B assesses general cognitive function specifically assesses working memory, visual processing,  visuospatial skills, selective and divided attention, processing speed requiring him to sequence ascending number and letters. Pt performs in 43 seconds (norm: 75 seconds)    VISUAL SKILLS  Visual Acuity: Wears glasses  Visual Field: Appears normal  Visual Attention: Appears normal  Oculomotor: WNL    SENSATION: LE Sensation WNL    RANGE OF MOTION: achieves full range of motion for bilateral upper extremity; mild left shoulder rigidity  STRENGTH: UE Strength WFL      Left Right    Strength (lbs) 86 lb 75 lb   Lateral Pinch (lbs)       COORDINATION:   Functional Dexterity Test:   Right  hand: 30 seconds  Left hand: 48 seconds (minimal functional score 34-50 sec)    Nine Hole Peg Test:   Left Hand, Nine Hole Peg Test (sec): 25 seconds (average 14-26)  Right Hand, Nine Hole Peg Test (sec): 20 seconds (average 14-23  Timed Handwriting (or written assessment): Whales live in a blue ocean: 9 seconds; no micrographia at this time    Bradykinesia and Hypokinesia (Combining slowness, hesitancy, decreased arm swing, small amplitude and poverty of movement in general): Minimal - reduced left arm swing, decreased dexterity   Festination: No  Freezing  No   Dyskinesia: No  Posture: mild rounded shoulder posture  Rigidity: mild LUE - shoulder, hand; left hip   Tremor: yes; left hand tremor    BALANCE:  challenges with high level balance activities    The Timed Up & Go Test (TUG) is designed to assess mobility risk of falls in adult populations. Pt was observed ambulating 10 feet, turning, and returning to chair without use of an assistive device. Scores above 12 seconds indicates risk for falls. Pt initiates sit - stand transfer with UE as postural support. Pt displayed decreased left arm swing with mild left hand tremor, normal stride length, initiates turning, no postural instability  Results revealed a score of 4.59 seconds.     The Timed Up & Go: COGNITIVE: Patient was observed to perform dual motor and cognitive task  ambulating 10 ft and counting backwards by 3's from 100. Score: 5.48 seconds. Score >14.7 sec indicates increased risk of falls.     Four Square Step Test is a test of cognitively-demanding dynamic balance assessing the person's ability to step over obstacles forward, sideways, and backwards. Scores > 9.68 seconds suggests increased risk of falls in the parkinson's population. Results reveal a score of 5.56 seconds and able to clear threshold.      FUNCTIONAL MOBILITY  Assistive Device(s): None  Ambulation: Independent - decreased left arm swing and onset of tremor when walking      BED  MOBILITY:  independent; denies challenges with bed transfers, rolling, scooting     TRANSFERS:  Independent  ; denies challenges with transfers     BATHING:  Independent    UPPER BODY DRESSING: Independent; denies challenges with dressing fasteners    LOWER BODY DRESSING: Independent    TOILETING: Independent    GROOMING: Independent    EATING/SELF FEEDING: Independent;  denies challenges loading utensils, food/drink to mouth, or cutting foods    ACTIVITY TOLERANCE: He lives an active lifestyle and goes to the gym daily and has a . He enjoys playing football with his son (throws with left hand). He has noticed benefits of exercise regarding flexibility and energy.     INSTRUMENTAL ACTIVITIES OF DAILY LIVING (IADL):   Meal Planning/Prep: Denies challenges with meal preparation  Home/Financial Management: Denies challenges with household management tasks. Denies challenges carrying/retrieving items  Communication/Computer Use: Denies issues with handwriting at this time (writes right handed). He has noticed some challenges with L right finger when typing with some errors and required to take breaks.   Community Mobility: Drives without concerns  Care of Others: two children     Assessment & Plan   CLINICAL IMPRESSIONS  Medical Diagnosis: Young-onset Parkinson's disease (G20.A1)  - Primary    Treatment Diagnosis: incoordination    Impression/Assessment:  Patient is an 48 year old left hand dominant male was referred to outpatient occupational therapy in to setting of recent diagnosis of young-onset Parkinson's disease.   The following significant findings have been identified:  Impaired coordination, stiffness, challenges with high level balance.  These identified deficits interfere with their ability to perform  work tasks (ie: typing, manipulation of objects) and recreational activities as compared to previous level of function. Patient benefits from skilled outpatient therapy for education regarding sensory/  cognitive/environmental strategies to maximize motor performance, instruction in home exercise program with emphasis on large amplitude movements, fine motor coordination exercises to optimize distal hand control, and daily living modifications to improve distal control minimize impact of tremor on function.    Clinical Decision Making (Complexity):  Assessment of Occupational Performance: 1-3 Performance Deficits  Occupational Performance Limitations: work, typing, manipulation of objects, and recreational activities   Clinical Decision Making (Complexity): Low complexity    PLAN OF CARE  Treatment Interventions:  Interventions: Self-Care/Home Management, Therapeutic Activity, Therapeutic Exercise    Long Term Goals   OT Goal 1  Goal Identifier: Tremor Management  Goal Description: Patient will be educated on daily living modifications to improve distal control and task performance to minimize tremor on function and maximize independence in ADL/IADL's  Target Date: 04/29/24  OT Goal 2  Goal Identifier: FMC  Goal Description: Patient will independently use strategies (BIG movements and hand flicks) to increase coordination to assist with FMC tasks such as typing, texting,  FM ADL's by improved scores on 9HPT by 3 seconds or more and improved scores on Functional Dexterity Test by 10 seconds  Target Date: 04/29/24  OT Goal 3  Goal Identifier: HEP  Goal Description: Patient will demonstrate independence with high amplitude/PWR! HEP to mitigate progression of symptoms (reduce stiffness, improve posture) and optimize automaticity and amplitude of movement to improve ease, efficiency, and safety during ADL/IADL s.  Target Date: 04/29/24      Frequency of Treatment: 1x/week  Duration of Treatment: 8 weeks     Recommended Referrals to Other Professionals:  none  Education Assessment: Learner/Method: Patient;Listening;Demonstration     Risks and benefits of evaluation/treatment have been explained.    Patient/Family/caregiver agrees with Plan of Care.     Evaluation Time:    OT Eval, Low Complexity Minutes (70879): 35    Signing Clinician: Leah Gonzalez, OTR/L, CNS, CSRS

## 2024-03-04 NOTE — PROGRESS NOTES
PHYSICAL THERAPY EVALUATION  Type of Visit: Evaluation    See electronic medical record for Abuse and Falls Screening details.    Subjective       Presenting condition or subjective complaint: Left hand dexterity.  Tremor in left hand.  Glitch/flexibility in left hip  Date of onset: 02/08/24    Diagnosis: Parkinson's disease  Date of diagnosis: 1/23/24  Patient's Neurologist: Louie Aguirre MD  Date of last Neurologist visit: 2/8/2024   First movement disorder symptom presentation and Date: began experiencing L hand/thumb tremors about 3-4 years ago  Non-motor symptoms: Lmild fatigue which improves with exercise, anxiety, urinary symptoms.    Time of last PD med: 7:30 Time of next PD med: 1200. On/Off presentation/symptoms: none.   History of PD specific PT? none  Current Activity Level: running - works out daily.  HIIT 20-30;   3x/wk - squats, resistance.  HIIT 20-30 minutes.  Off days: planks, push ups, squats then on treadmill or ellipical.  -165 bpm for 2.5 minutes interval training.  Works full time and coaches  Chief Mobility Complaint: balance issues during high level activities and experiences intermittent left leg tremors, L hip tightness leading to difficulty with stretching and feels there is a catch, intermittent L side pain located just above iliac crest. He experiences L toe catch while running over 2 miles. Per MD notes  Freezing: none    Relevant medical history: Depression   Dates & types of surgery: Right achilles almost 20 years ago    Prior diagnostic imaging/testing results: MRI     Prior therapy history for the same diagnosis, illness or injury: No      Prior Level of Function  Transfers: Independent  Ambulation: Independent  ADL: Independent  IADL:     Living Environment  Social support: With family members; wife, 13 yo daughter, 10 yo son.   Type of home: House   Stairs to enter the home: Yes 2 Is there a railing: Yes   Ramp: No   Stairs inside the home: Yes  17 Is there a railing: Yes   Help at home: None  Equipment owned:       Employment: Yes [employer]   Hobbies/Interests: Running - currently limited by L hip catch/toe catch issues    Patient goals for therapy: No - learn exercises and ways to manage symptoms and disease progression    Pain assessment: Pain denied     Objective     OBSERVATION  Bradykinesia and Hypokinesia (Combining slowness, hesitency, decreased arm swing, small amplitude and poverty of movement in general): Minimal - reduced arm swing on L  Dyskinesia:No  Posture: Normal     RANGE OF MOTION: LE ROM WFL, however L hip is limited compared to R  STRENGTH: LE Strength WNL    BED MOBILITY: Independent; slightly increased time for rolling onto side    TRANSFERS: Independent    GAIT:   Gait Description:  reciprocal gait, appropriate heel strike and step length    BALANCE:     SPECIAL TESTS    360 degree turn (steps) 6 CW; 6 CCW      With no AD   Timed Up and Go (TUG) - sec 7.62s   With no AD   Cognitive TUG - sec  Motor TUG  Cog/Motor TUG 7.88s        Task: counting backwards by 3s from 100  6.75s        Motor Task: Single hand ball toss (L hand)  9.75s        Motor + Cognitive Task: naming girl's names in alphabetical order and left hand ball toss     5 Times Sit-to-Stand (5TSTS)  9.40s  Age/Gender Norm: 7.6 +/- 1.8 sec   30 sec Chair Stand 20 reps      Comments: slower movements initially  Age/Gender Norm: M60 - 63 y/o: 16     Mini BESTest  26/28   (<22/28 indicates fall risk)  See full MiniBEST for details   Functional Gait Assessment (FGA) TOTAL SCORE: (MAXIMUM SCORE 30): 29  (<22/30 indicates fall risk)   10 Meter Walk Test (Comfortable)  4.44s - 1.35 m/s  # of steps: 9  Age/Gender Norm: 1.434 meters/second   10 Meter Walk Test (Fast)  2.70s  - 2.22 m/s  # of steps: 6   6 Minute Walk Test (6MWT)   NT due to time constraints - will assess next session        Age/Gender Norm: M60 - 63 y/o: 2,022 feet       SENSATION: LE Sensation  WNL      Assessment & Plan   CLINICAL IMPRESSIONS  Medical Diagnosis: Parkinson's disease    Treatment Diagnosis: impairment of balance, coordination   Impression/Assessment: Patient is a 48 year old male who was recently diagnosed with Parkinson's disease and presents with balance/coordination complaints.  The following significant findings have been identified: Impaired balance. These impairments interfere with their ability to perform recreational activities as compared to previous level of function.  He scores slightly below age/gender norms with 5xSTS and gait speed and was challenged by 3 task divided attention activities.  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     Clinical Decision Making (Complexity):  Clinical Presentation: Stable/Uncomplicated  Clinical Presentation Rationale: based on medical and personal factors listed in PT evaluation  Clinical Decision Making (Complexity): Low complexity    PLAN OF CARE  Treatment Interventions:  Interventions: Neuromuscular Re-education, Therapeutic Activity, Therapeutic Exercise, Standardized Testing    Long Term Goals     PT Goal 1  Goal Identifier: HiMAT  Goal Description: Pt will score >49/54 on the HiMAT in order to improve high level balance for community activities and hobbies  Rationale: to maximize safety and independence within the community  Target Date: 06/03/24  PT Goal 2  Goal Identifier: HEP  Goal Description: Pt will be independent with a HEP to self manage symptoms  Target Date: 06/03/24      Frequency of Treatment: 1x/wk  Duration of Treatment: up to 90 days    Recommended Referrals to Other Professionals:   Education Assessment:   Learner/Method: Patient;Pictures/Video;Demonstration;No Barriers to Learning  Education Comments: PT role, POC, HEP    Risks and benefits of evaluation/treatment have been explained.   Patient/Family/caregiver agrees with Plan of Care.      Evaluation Time:     PT Carolann, Low Complexity Minutes (93702): 40       Signing Clinician: Hood Grijalva PT

## 2024-03-05 NOTE — PROGRESS NOTES
Mini-BESTest: Balance Evaluation Systems Test    0232-1514 [facility]iatric [facility] & [facility]. All rights reserved.    ANTICIPATORY BALANCE  1. SIT TO STAND  Instruction:  Cross your arms across your chest. Try not to use your hands unless you must. Do not let your legs lean  against the back of the chair when you stand. Please stand up now.   (2) Normal: Comes to stand without use of hands and stabilizes independently.  (1) Moderate: Comes to stand WITH use of hands on first attempt.  (0) Severe: Unable to stand up from chair without assistance, OR needs several attempts with use of hands.    2. RISE TO TOES  Instruction:  Place your feet shoulder width apart. Place your hands on your hips. Try to rise as high as you can onto your  toes. I will count out loud to 3 seconds. Try to hold this pose for at least 3 seconds. Look straight ahead. Rise now.   (2) Normal: Stable for 3 s with maximum height.  (1) Moderate: Heels up, but not full range (smaller than when holding hands), OR noticeable instability for 3 s.  (0) Severe: < 3 s.    3. STAND ON ONE LEG  Instruction:  Look straight ahead. Keep your hands on your hips. Lift your leg off of the ground behind you without touching or  resting your raised leg upon your other standing leg. Stay standing on one leg as long as you can. Look straight ahead. Lift  now.   Left: Time in Seconds Trial 1:__24s___Trial 2:_____  (2) Normal: 20 s.  (1) Moderate: < 20 s.  (0) Severe: Unable.    Right: Time in Seconds Trial 1:__25s___Trial 2:_____  (2) Normal: 20 s.  (1) Moderate: < 20 s.  (0) Severe: Unable    To score each side separately use the trial with the longest time.  To calculate the sub-score and total score use the side [left or right] with the lowest numerical score [i.e. the worse side].    REACTIVE POSTURAL CONTROL  4. COMPENSATORY STEPPING CORRECTION- FORWARD  Instruction:  Stand with your feet shoulder width apart, arms at your sides. Lean forward against my hands  beyond your  forward limits. When I let go, do whatever is necessary, including taking a step, to avoid a fall.   (2) Normal: Recovers independently with a single, large step (second realignment step is allowed).  (1) Moderate: More than one step used to recover equilibrium.  (0) Severe: No step, OR would fall if not caught, OR falls spontaneously.    5. COMPENSATORY STEPPING CORRECTION- BACKWARD  Instruction:  Stand with your feet shoulder width apart, arms at your sides. Lean backward against my hands beyond your  backward limits. When I let go, do whatever is necessary, including taking a step, to avoid a fall.   (2) Normal: Recovers independently with a single, large step.  (1) Moderate: More than one step used to recover equilibrium.  (0) Severe: No step, OR would fall if not caught, OR falls spontaneously.    6. COMPENSATORY STEPPING CORRECTION- LATERAL  Instruction:  Stand with your feet together, arms down at your sides. Lean into my hand beyond your sideways limit. When I  let go, do whatever is necessary, including taking a step, to avoid a fall.   Left  (2) Normal: Recovers independently with 1 step(crossover or lateral OK). - slight delay in stepping reaction observed  (1) Moderate: Several steps to recover equilibrium.  (0) Severe: Falls, or cannot step.    Right  (2) Normal: Recovers independently with 1 step(crossover or lateral OK). - slight delay in stepping reacting observed  (1) Moderate: Several steps to recover equilibrium.  (0) Severe: Falls, or cannot step.    To calculate the sub-score and total score use the side [left or right] with the lowest numerical score [i.e. the worse side].    SENSORY ORIENTATION  7. STANCE (FEET TOGETHER); EYES OPEN, FIRM SURFACE  Instruction:  Place your hands on your hips. Place your feet together until almost touching. Look straight ahead. Be as stable  and still as possible, until I say stop.   Time in seconds:___>30s_____  (2) Normal: 30 s.  (1) Moderate: < 30  s.  (0) Severe: Unable.    8. STANCE (FEET TOGETHER); EYES CLOSED, FOAM SURFACE  Instruction:  Step onto the foam. Place your hands on your hips. Place your feet together until almost touching. Be as stable  and still as possible, until I say stop. I will start timing when you close your eyes.   Time in seconds:___>30s_____ - postural sway noted  (2) Normal: 30 s.  (1) Moderate: < 30 s.  (0) Severe: Unable.    9. INCLINE- EYES CLOSED  Instruction:  Step onto the incline ramp. Please stand on the incline ramp with your toes toward the top. Place your feet  shoulder width apart and have your arms down at your sides. I will start timing when you close your eyes.   Time in seconds:__>30s______  (2) Normal: Stands independently 30 s and aligns with gravity.  (1) Moderate: Stands independently <30 s OR aligns with surface.  (0) Severe: Unable.    DYNAMIC GAIT  10. CHANGE IN GAIT SPEED  Instruction:  Begin walking at your normal speed, when I tell you  fast , walk as fast as you can. When I say  slow , walk very  slowly.   (2) Normal: Significantly changes walking speed without imbalance.  (1) Moderate: Unable to change walking speed or signs of imbalance.  (0) Severe: Unable to achieve significant change in walking speed AND signs of imbalance.    11. WALK WITH HEAD TURNS - HORIZONTAL  Instruction:  Begin walking at your normal speed, when I say  right , turn your head and look to the right. When I say  left   turn your head and look to the left. Try to keep yourself walking in a straight line.   (2) Normal: performs head turns with no change in gait speed and good balance.  (1) Moderate: performs head turns with reduction in gait speed.  (0) Severe: performs head turns with imbalance.    12. WALK WITH PIVOT TURNS  Instruction:  Begin walking at your normal speed. When I tell you to  turn and stop , turn as quickly as you can, face the  opposite direction, and stop. After the turn, your feet should be close  together.   (2) Normal: Turns with feet close FAST (< 3 steps) with good balance.  (1) Moderate: Turns with feet close SLOW (>4 steps) with good balance.  (0) Severe: Cannot turn with feet close at any speed without imbalance.    13. STEP OVER OBSTACLES  Instruction:  Begin walking at your normal speed. When you get to the box, step over it, not around it and keep walking.   (2) Normal: Able to step over box with minimal change of gait speed and with good balance.  (1) Moderate: Steps over box but touches box OR displays cautious behavior by slowing gait.  (0) Severe: Unable to step over box OR steps around box.    14. TIMED UP & GO WITH DUAL TASK [3 METER WALK]  Instruction TUG:  When I say  Go , stand up from chair, walk at your normal speed across the tape on the floor, turn around,  and come back to sit in the chair.   Instruction TUG with Dual Task:  Count backwards by threes starting at ___. When I say  Go , stand up from chair, walk at  your normal speed across the tape on the floor, turn around, and come back to sit in the chair. Continue counting backwards  the entire time.   TUG: __7.62______seconds; Dual Task TUG: ___7.88_____seconds  (2) Normal: No noticeable change in sitting, standing or walking while backward counting when compared to TUG without  Dual Task.  (1) Moderate: Dual Task affects either counting OR walking (>10%) when compared to the TUG without Dual Task.  (0) Severe: Stops counting while walking OR stops walking while counting.    When scoring item 14, if subject s gait speed slows more than 10% between the TUG without and with a Dual Task the score  should be decreased by a point.    REACTIVE POSTURAL CONTROL SUB SCORE: 6/ 6  ANTICIPATORY SUB SCORE: 6/ 6  SENSORY ORIENTATION SUB SCORE: 6/ 6  DYNAMIC GAIT SUB SCORE: 8/10    TOTAL SCORE: ___26_____/28      Mini-BESTest: The Mini-BESTest assesses reactive postural, anticipatory, sensory orientation, and dynamic gait balance.  Gait assistive  device used: none     Patient Score: 26/28  Scores of <17.5/28 have identified people with chronic stroke that have a history of falling according to Hailey et al 2013.   Scores of <22/28 are predictive of increased falls risk for persons with Parkinsons Disease    Minimally Detectable Change (MDC) for persons with Parkinsons Disease 5.52pts per Lakisha et al 2011.  Minimally Clinically Significant Difference (MCID) for persons with Balance Disorders: 4pts according to Lizeth et al 2013.

## 2024-03-06 ENCOUNTER — TELEPHONE (OUTPATIENT)
Dept: NEUROLOGY | Facility: CLINIC | Age: 49
End: 2024-03-06
Payer: COMMERCIAL

## 2024-03-06 NOTE — TELEPHONE ENCOUNTER
[facility] Call Center    Phone Message    May a detailed message be left on voicemail: yes     Reason for Call: Appointment Intake    Referring Provider Name: Sincere Mcneil    Genetic per referral    Please reach out to Tank to reschedule appointment.  Tank can be reached at:  [phone]      Action Taken: Other: Neurology    Travel Screening: Not Applicable

## 2024-03-07 ENCOUNTER — TELEPHONE (OUTPATIENT)
Dept: NEUROLOGY | Facility: CLINIC | Age: 49
End: 2024-03-07
Payer: COMMERCIAL

## 2024-03-07 NOTE — TELEPHONE ENCOUNTER
GENETIC COUNSELING-Neurology  I received a message that Tank was trying to schedule genetics consult. I had previouslly left him a message on 2/9. I left another message today offering possible times and also sent a mychart message.    Sincere Mcneil MS, [facility] [place]  Certified Genetic Counselor

## 2024-03-07 NOTE — TELEPHONE ENCOUNTER
[facility] Call Center    Phone Message    May a detailed message be left on voicemail: yes     Reason for Call: Appointment Intake    Referring Provider Name: NEETA PAIZ    Diagnosis and/or Symptoms: Young-onset Parkinson's disease.  Genetic testing per referral.    Writer unable to schedule appointment due to protocols directing to send to clinical pools.     Patient can be reached for scheduling at:  [phone]    Action Taken: Other: Neurology    Travel Screening: Not Applicable

## 2024-03-11 ENCOUNTER — TELEPHONE (OUTPATIENT)
Dept: NEUROLOGY | Facility: CLINIC | Age: 49
End: 2024-03-11
Payer: COMMERCIAL

## 2024-03-11 NOTE — TELEPHONE ENCOUNTER
Provider clinic schedule has been adjusted as of the end of March.  A message was left informing patient to arrive at 10:15 for a 10:30 return visit with Dr. Duran on 3-25-24.    Patient advised to call and reschedule if the new clinic time does not work for the patient by calling [phone]

## 2024-03-15 ENCOUNTER — VIRTUAL VISIT (OUTPATIENT)
Dept: NEUROLOGY | Facility: CLINIC | Age: 49
End: 2024-03-15
Payer: COMMERCIAL

## 2024-03-15 ENCOUNTER — THERAPY VISIT (OUTPATIENT)
Dept: OCCUPATIONAL THERAPY | Facility: CLINIC | Age: 49
End: 2024-03-15
Payer: COMMERCIAL

## 2024-03-15 VITALS — WEIGHT: 170 LBS | BODY MASS INDEX: 23.8 KG/M2 | HEIGHT: 71 IN

## 2024-03-15 DIAGNOSIS — R27.9 INCOORDINATION: ICD-10-CM

## 2024-03-15 DIAGNOSIS — G20.A1 YOUNG-ONSET PARKINSON'S DISEASE (H): Primary | ICD-10-CM

## 2024-03-15 DIAGNOSIS — G20.A1 EARLY-ONSET PARKINSON'S DISEASE (H): Primary | ICD-10-CM

## 2024-03-15 PROCEDURE — 97110 THERAPEUTIC EXERCISES: CPT | Mod: GO

## 2024-03-15 PROCEDURE — 96040 PR GENETIC COUNSELING, EACH 30 MIN: CPT | Mod: 95 | Performed by: GENETIC COUNSELOR, MS

## 2024-03-15 ASSESSMENT — PAIN SCALES - GENERAL: PAINLEVEL: NO PAIN (0)

## 2024-03-15 NOTE — NURSING NOTE
Is the patient currently in the state of [place]? YES    Visit mode:VIDEO    If the visit is dropped, the patient can be reconnected by: VIDEO VISIT: Text to cell phone:   Telephone Information:   Mobile [phone]       Will anyone else be joining the visit? NO  (If patient encounters technical issues they should call [phone]    How would you like to obtain your AVS? MyChart    Are changes needed to the allergy or medication list? No    Reason for visit: Consult    Adriana BAKER

## 2024-03-15 NOTE — LETTER
"3/15/2024       RE: Christiano Dong  [address]  [facility] [address]     Dear Colleague,    Thank you for referring your patient, Christiano Dong, to the [facility] [place] [facility]. Please see a copy of my visit note below.      GENETIC COUNSELING-Neurology  Tank Dong was seen today for genetic consultation.  I spent a total of 40 minutes with the patient with the majority of the time being spent on genetic counseling and testing options. The patient was previously seen by Dr. Duran     MEDICAL HISTORY  Please see Dr. Duran's notes for a more thorough summary of medical history. Briefly, Tank reports onset of left thumb 'twitch' about 2 years ago. He also noted issues with his gait when running. He has subsequently developed more significant tremor and was recently diagnosed with Parkinson disease.     FAMILY HISTORY-see scanned pedigree  Tank has two healthy children.  Tank's younger sister has been diagnosed with \"MS.\"  Tank's father has a hand and head tremor with onset at age 55.  Tank's paternal uncle has Parkinson disease, onset late 60's.  Tank's paternal cousin (not the child of the above uncle) was diagnosed with parkinson disease in his late 30's.  Tank's paternal grandfather was diagnosed with Parkinson disease in his 60's.  Another paternal relative (?cousin of his father) was diagnosed with Parkinson disease.         GENETIC COUNSELING  We discussed the genetic and environmental basis of Parkinson disease. I explained that in most cases, it results from complex and lifelong interaction of multiple genetic and environmental factors. In some cases, there may be a single gene cause.  I explained that the presence of other family members with Parkinson disease and/or young age of onset are often clues for a genetic basis.    We discussed rare Mendelian forms of Parkinson disease. With these forms of " Parkinson disease, a single genetic change (or a pair of changes) is sufficient to cause Parkinson disease.  I explained that there are some dominant forms (e.g. SNCA) and some recessive forms (e.g. PRKN, PINK1).  We discussed the implications of autosomal dominant and autosomal recessive patterns of inheritance in terms of risks to siblings and children.  If one of these types of Parkinson disease is identified, predictive and./or carrier testing would be available to family members.    In addition, there are some common autosomal dominant genetic 'risk factors' for Parkinson disease (e.g. GBA and LRRK2).  Mutations in these genes confer an increased susceptibility but not certainty for developing Parkinson disease. If we find one of these variations in a person with Parkinson disease, it may provide a partial explanation for why they have this condition. Testing for these variants in other asymptomatic relatives is a complex decision as these results cannot definitively determine whether or not an asymptomatic individual will develop Parkinson disease.     I explained that Tank reported a quite compelling family history of Parkinson disease with 5 affected individuals across 3 generations, including another individual with early onset PD.  This certainly argues strongly for a genetic basis in his family.  I indicated that there is a reasonable chance that genetic testing could identify either a Mendelian cause, or an incompletely penetrant risk factor (e.g. LRRK2 or GBA).  We discussed the implications of such a finding for him, his children, and his siblings.  I explained that if genetic testing is normal, I think it is still reasonable to think that there is some genetic risk in his family.  There are likely additional PD genes that have not been discovered. In addition, the genetics of multifactorial or polygenic conditions is still poorly understood.        LESA Fu consented to genetic testing for  Parkinsonism through the [employer] patient pay program.        Again, thank you for allowing me to participate in the care of your patient.      Sincerely,    Sajan Mcneil GC

## 2024-03-15 NOTE — PROGRESS NOTES
"Virtual Visit Details    Type of service:  Video Visit     Originating Location (pt. Location): Home    Distant Location (provider location):  On-site  Platform used for Video Visit: Justine    GENETIC COUNSELING-Neurology  Tank Dong was seen today for genetic consultation.  I spent a total of 40 minutes with the patient with the majority of the time being spent on genetic counseling and testing options. The patient was previously seen by Dr. Duran     MEDICAL HISTORY  Please see Dr. Duran's notes for a more thorough summary of medical history. Briefly, Tank reports onset of left thumb 'twitch' about 2 years ago. He also noted issues with his gait when running. He has subsequently developed more significant tremor and was recently diagnosed with Parkinson disease.     FAMILY HISTORY-see scanned pedigree  Tank has two healthy children.  Tank's younger sister has been diagnosed with \"MS.\"  Tank's father has a hand and head tremor with onset at age 55.  Tank's paternal uncle has Parkinson disease, onset late 60's.  Tank's paternal cousin (not the child of the above uncle) was diagnosed with parkinson disease in his late 30's.  Tank's paternal grandfather was diagnosed with Parkinson disease in his 60's.  Another paternal relative (?cousin of his father) was diagnosed with Parkinson disease.          GENETIC COUNSELING  We discussed the genetic and environmental basis of Parkinson disease. I explained that in most cases, it results from complex and lifelong interaction of multiple genetic and environmental factors. In some cases, there may be a single gene cause.  I explained that the presence of other family members with Parkinson disease and/or young age of onset are often clues for a genetic basis.    We discussed rare Mendelian forms of Parkinson disease. With these forms of Parkinson disease, a single genetic change (or a pair of changes) is sufficient to cause Parkinson disease.  I explained that there are " some dominant forms (e.g. SNCA) and some recessive forms (e.g. PRKN, PINK1).  We discussed the implications of autosomal dominant and autosomal recessive patterns of inheritance in terms of risks to siblings and children.  If one of these types of Parkinson disease is identified, predictive and./or carrier testing would be available to family members.    In addition, there are some common autosomal dominant genetic 'risk factors' for Parkinson disease (e.g. GBA and LRRK2).  Mutations in these genes confer an increased susceptibility but not certainty for developing Parkinson disease. If we find one of these variations in a person with Parkinson disease, it may provide a partial explanation for why they have this condition. Testing for these variants in other asymptomatic relatives is a complex decision as these results cannot definitively determine whether or not an asymptomatic individual will develop Parkinson disease.     I explained that Tank reported a quite compelling family history of Parkinson disease with 5 affected individuals across 3 generations, including another individual with early onset PD.  This certainly argues strongly for a genetic basis in his family.  I indicated that there is a reasonable chance that genetic testing could identify either a Mendelian cause, or an incompletely penetrant risk factor (e.g. LRRK2 or GBA).  We discussed the implications of such a finding for him, his children, and his siblings.  I explained that if genetic testing is normal, I think it is still reasonable to think that there is some genetic risk in his family.  There are likely additional PD genes that have not been discovered. In addition, the genetics of multifactorial or polygenic conditions is still poorly understood.        LESA Fu consented to genetic testing for Parkinsonism through the [employer] patient pay program.    Sincere Mcneil MS, [facility]  Certified Genetic Counselor  [facility]

## 2024-03-22 ENCOUNTER — TELEPHONE (OUTPATIENT)
Dept: NEUROLOGY | Facility: CLINIC | Age: 49
End: 2024-03-22
Payer: COMMERCIAL

## 2024-03-22 NOTE — TELEPHONE ENCOUNTER
Attempted to reach patient to remind them about appointment scheduled with Louie Fields MD on 3/25/24 in our [place] location.  A voicemail was left with a call back number if the patient has questions or would like to reschedule.

## 2024-03-25 ENCOUNTER — OFFICE VISIT (OUTPATIENT)
Dept: NEUROLOGY | Facility: CLINIC | Age: 49
End: 2024-03-25
Payer: COMMERCIAL

## 2024-03-25 VITALS — HEART RATE: 83 BPM | SYSTOLIC BLOOD PRESSURE: 116 MMHG | DIASTOLIC BLOOD PRESSURE: 73 MMHG | OXYGEN SATURATION: 96 %

## 2024-03-25 DIAGNOSIS — G20.A1 YOUNG-ONSET PARKINSON'S DISEASE (H): Primary | ICD-10-CM

## 2024-03-25 PROCEDURE — G2211 COMPLEX E/M VISIT ADD ON: HCPCS | Performed by: STUDENT IN AN ORGANIZED HEALTH CARE EDUCATION/TRAINING PROGRAM

## 2024-03-25 PROCEDURE — 99214 OFFICE O/P EST MOD 30 MIN: CPT | Performed by: STUDENT IN AN ORGANIZED HEALTH CARE EDUCATION/TRAINING PROGRAM

## 2024-03-25 ASSESSMENT — UNIFIED PARKINSONS DISEASE RATING SCALE (UPDRS)
SPONTANEITY_OF_MOVEMENT: (0) NORMAL: NO PROBLEMS.
GAIT: (0) NORMAL: NO PROBLEMS.
RIGIDITY_RLE: (0) NORMAL: NO RIGIDITY.
LEG_AGILITY_RIGHT: (0) NORMAL: NO PROBLEMS.
LEG_AGILITY_LEFT: (0) NORMAL: NO PROBLEMS.
HANDMOVEMENTS_RIGHT: (0) NORMAL: NO PROBLEMS.
TOETAPPING_LEFT: (1) SLIGHT: ANY OF THE FOLLOWING: A) THE REGULAR RHYTHM IS BROKEN WITH ONE WITH ONE OR TWO INTERRUPTIONS OR HESITATIONS OF THE MOVEMENT  B) SLIGHT SLOWING  C) THE AMPLITUDE DECREMENTS NEAR THE END OF THE 10 MOVEMENTS.
RIGIDITY_LLE: (1) SLIGHT: RIGIDITY ONLY DETECTED WITH ACTIVATION MANEUVER.
PARKINSONS_MEDS: ON
AMPLITUDE_RLE: (0) NORMAL: NO TREMOR.
RIGIDITY_NECK: (0) NORMAL: NO RIGIDITY.
RIGIDITY_RUE: (1) SLIGHT: RIGIDITY ONLY DETECTED WITH ACTIVATION MANEUVER.
POSTURE: (0) NORMAL: NO PROBLEMS.
FINGER_TAPPING_RIGHT: (0) NORMAL: NO PROBLEMS.
FINGER_TAPPING_LEFT: (1) SLIGHT: ANY OF THE FOLLOWING: A) THE REGULAR RHYTHM IS BROKEN WITH ONE WITH ONE OR TWO INTERRUPTIONS OR HESITATIONS OF THE MOVEMENT  B) SLIGHT SLOWING  C) THE AMPLITUDE DECREMENTS NEAR THE END OF THE 10 MOVEMENTS.
FACIAL_EXPRESSION: (1) SLIGHT: MINIMAL MASKED FACIES MANIFESTED ONLY BY DECREASED FREQUENCY OF BLINKING.
PRONATION_SUPINATION_RIGHT: (0) NORMAL: NO PROBLEMS.
CONSTANCY_TREMOR_ATREST: (1) SLIGHT: TREMOR AT REST IS PRESENT 25% OF THE ENTIRE EXAMINATION PERIOD.
POSTURAL_STABILITY: (0) NORMAL: NO PROBLEMS. RECOVERS WITH ONE OR TWO STEPS.
AXIAL_SCORE: 1
HANDMOVEMENTS_LEFT: (1) SLIGHT: ANY OF THE FOLLOWING: A) THE REGULAR RHYTHM IS BROKEN WITH ONE WITH ONE OR TWO INTERRUPTIONS OR HESITATIONS OF THE MOVEMENT  B) SLIGHT SLOWING  C) THE AMPLITUDE DECREMENTS NEAR THE END OF THE 10 MOVEMENTS.
DYSKINESIAS_PRESENT: NO
RIGIDITY_LUE: (2) MILD: RIGIDITY DETECTED WITHOUT THE ACTIVATION MANEUVER. FULL RANGE OF MOTION IS EASILY ACHIEVED.
TOTAL_SCORE: 1
FREEZING_GAIT: (0) NORMAL: NO FREEZING.
AMPLITUDE_LLE: (0) NORMAL: NO TREMOR.
MOVEMENTS_INTERFERE_WITH_RATINGS: NO
ARISING_CHAIR: (0) NORMAL: NO PROBLEMS. ABLE TO ARISE QUICKLY WITHOUT HESITATION.
SPEECH: (0) NORMAL: NO SPEECH PROBLEMS.
TOTAL_SCORE: 13
AMPLITUDE_RUE: (0) NORMAL: NO TREMOR.
TOETAPPING_RIGHT: (0) NORMAL: NO PROBLEMS.
PRONATION_SUPINATION_LEFT: (1) SLIGHT: ANY OF THE FOLLOWING: A) THE REGULAR RHYTHM IS BROKEN WITH ONE WITH ONE OR TWO INTERRUPTIONS OR HESITATIONS OF THE MOVEMENT  B) SLIGHT SLOWING  C) THE AMPLITUDE DECREMENTS NEAR THE END OF THE 10 MOVEMENTS.
TOTAL_SCORE_LEFT: 10
AMPLITUDE_LIP_JAW: (0) NORMAL: NO TREMOR.
AMPLITUDE_LUE: (2) MILD: > 1 CM BUT < 3 CM IN MAXIMAL AMPLITUDE.

## 2024-03-25 NOTE — PATIENT INSTRUCTIONS
Your neurological exam looks great today! Keep up your excellent exercise routine.     We will not make any changes to you pramipexole today. Continue the schedule that Michelle Feng provided. If you feel your symptoms are not well controlled on this dose, there is room to increase.     Continue working with physical, occupational and speech therapy.

## 2024-03-25 NOTE — LETTER
3/25/2024         RE: Christiano Dong  [address]  [facility] [address]        Dear Colleague,    Thank you for referring your patient, Christiano Dong, to the [facility]. Please see a copy of my visit note below.    Department of Neurology  Movement Disorders Division   Follow-up Note    Patient: Christiano Dong   MRN: [id number]   : 1975   Date of Visit: 2024     Chief Complaint:  Christiano Dong is a 48 year old male who returns to clinic for follow up of young-onset Parkinson's disease.     Interval History:  Mr. Dong was initially seen as a second opinion consult on 2024 at which time his exam was felt to be consistent with young-onset, tremor predominant Parkinson's disease. He was started on Pramipexole with initial goal dose of 0.5 mg TID. He was referred to [facility] pharmacy and met with Michelle Feng, PharmD. He also met with Sajan Mcneil, genetic counselor, on 3/15/2024 and has opted to pursue genetic [facility].     Mr. Dong presents by himself today.     Mr. Dong states that his tremor seems to be better than when he last saw Dr. Duran. It seems to be less frequent. He is not sure if this is because he is in a better place mentally or if the medication is helping somewhat.     He saw Michelle Feng who recommended increasing Pramipexole to 3 tabs with each dose. He is on the last increase.     He exercises 30-60 minutes daily - he does a combination of strength training, cardio, stretching. He is working with a  on balance as well which has been helpful. He has also been focusing more on his diet - eating more fruits, vegetables, nuts, seeds. He has cut out red meat and dairy. This has seemed to help his exercise as he feels he has less inflammation and is able to recover from work outs more easily.     He flew back from [place] this morning - he had been  there for his son's football tournament. He just got his genetic testing kit mailed to him and will be sending that in.     Parkinson Disease Motor Symptom Review:    Motor fluctuations:     Dyskinesia:  Denies  Wearing off: Denies  Freezing of gait: Denies  Tremor: Left hand - little better than before.   Rigidity: Little bit of stiffness in the toes - pretty infrequent (briefly a couple times a day), not clear if meds have helped.  Bradykinesia: Left hand is less dextrous - he is working with an occupational therapist which has been helpful. Pramipexole has maybe helped with typing.      Parkinson's Disease Non-motor Symptom Review:    Psychiatric disturbances - Mood has been really good. He denies feeling down, depressed or anxious. Exercise has been really helpful for mood.   Sleep disturbances - Sleep is pretty good. If he drinks wine he wakes every hour so he no longer is drinking alcohol. He wakes to urinate 2-3 times per night. Falls asleep easily after for the most part. He denies any dream enactment behavior - his wife has not mentioned anything.   GI symptoms - Little bit of constipation - mostly when traveling. No needing medications.   Urinary symptoms - Increased urinary urgency and frequency for a while. Drinks a lot of water when exercising.   Balance - Working with  on balance. No falls. Balance feels pretty good.   Pain - Denies  Autonomic dysfunction - Some lightheadedness when going from sitting to standing. He is not sure if this is maybe due to his blood sugar being low or possibly after a work out. Infrequent (once every 2-3 days), no loss of consciousness or significant functional impairment.   Hallucinations - Denies  Speech - Saw speech therapist at the beginning of the month. Will continue to work with them.   Swallowing - Denies concerns  Salivation - Denies concerns  Dopamine agonist side effects - Denies any new compulsions or impulsive behavior.       Current Medications:    Current Outpatient Medications   Medication Sig Dispense Refill     escitalopram (LEXAPRO) 20 MG tablet Take 20 mg by mouth daily       LORazepam (ATIVAN) 0.5 MG tablet Take 0.5 mg by mouth       pramipexole (MIRAPEX) 0.25 MG tablet 0.75mg QAM, 0.5mg QPM, 0.5mg at bedtime x 1 week, then increase PM dose to 0.75mg x 1 week, then 0.75mg three times daily thereafter 270 tablet 3       PD Medications                   8am 12pm 6pm       Pramipexole 0.25 mg                                             3 3 2     He gets slightly nauseated if he doesn't take pramipexole with food sometimes.     Allergies: has No Known Allergies.    Past Medical History:  Past Medical History:   Diagnosis Date     Anxiety        Past Surgical History:  No past surgical history on file.    Social History:  Social History     Socioeconomic History     Marital status:      Years of education: 16   Occupational History     Occupation: realestate developer   Tobacco Use     Smoking status: Never     Smokeless tobacco: Never   Substance and Sexual Activity     Alcohol use: Yes     Comment: has a glass of wine occassionally       Family History:  Family History   Problem Relation Age of Onset     Tremor Father      Multiple Sclerosis Sister      Parkinsonism Paternal Grandfather      Parkinsonism Paternal Uncle      Parkinsonism Cousin        Physical Exam:  The patient's  blood pressure is 116/73 and his pulse is 83. His oxygen saturation is 96%.      Last dose of medication: 0.75 mg pramipexole this morning around 4:30am    Neurological Examination:       3/25/2024    10:00 AM   UPDRS Motor Scale   Time: 10:50   Medication On   R Brain DBS: None   L Brain DBS: None   Dyskinesia (LID) No   Did LID interfere No   Speech 0   Facial Expression 1   Rigidity Neck 0   Rigidity RUE 1   Rigidity LUE 2   Rigidity RLE 0   Rigidity LLE 1   Finger Taps R 0   Finger Taps L 1   Hand Mvt R 0   Hand Mvt L 1   Pron-/Supinate R 0   Pron-/Supinate L 1   Toe  Tap R 0   Toe Tap L 1   Leg Agility R 0   Leg Agility L 0   Arise From Chair 0   Gait 0   Gait Freezing 0   Postural Stability 0   Posture 0   Global Spont Mvt 0   Postural Tremor RUE 0   Postural Tremor LUE 1   Kinetic Tremor RUE 0   Kinetic Tremor LUE 0   Rest Tremor RUE 0   Rest Tremor LUE 2   Rest Tremor RLE 0   Rest Tremor LLE 0   Rest Tremor Lip/Jaw 0   Rest Tremor Constancy 1   Total Right 1   Total Left 10   Axial Total 1   Total 13       Data Reviewed:   Sajan Mcneil's 3/15/2024 office note    Michelle Mattson's 3/1/24 and 2/15/2024 visit notes.    Impression:  Christiano Dong is a 48 year old male with young-onset Parkinson's disease. Mr. Dong presents for follow up of Parkinson's disease. He reports that he is in a much better mental place now than he had been when he was initially seen. He has seen Sajan Mcneil in genetics and Michelle Feng, [facility] pharmacy. He will be sending in genetic testing kit to [facility] shortly. He has tolerated pramipexole and has noticed some mild benefit in terms of tremor and L hand dexterity. He has worked with physical, occupational and speech therapy.     His neurological exam is notable for improvement in left hand bradykinesia and tremor with UPDRS 13 (compared to 16 at his initial appointment). He has done an excellent job of getting consistent and variable exercise. He is happy with his current level of symptom control and would like to finish his planned increase to 0.75 mg pramipexole TID and then stay at that dose for the time being.     Recommendations:     - Continue pramipexole with goal dose 0.75 mg TID  - Encouraged Mr. Dong to continue getting consistent exercise  - Mr. Dong will be sending in [facility] Parkinson's panel soon, follow up results  - Continue working with physical, occupational, speech therapy    Follow up in 3-4 months, 30 minutes    Mr. Dong was seen and discussed with movement disorder attending, Dr. Renee English,  MD  Movement Disorders Fellow    CC: Parkinson's disease    I, Louie Fields MD, personally interviewed, examined and evaluated this patient. I personally reviewed the vital signs, medications and pertinent labs/imaging. I discussed the patient with Dr. English and agree with the assessment, examination and plan of care documented, with the additions and/or exceptions delineated below:      Overall doing very well.  Tolerating pramipexole without any issues.  He is exercising more regularly, seeing a rehab also on a regular basis.  He feels he is at a point in which he does not need his pramipexole to be adjusted any further.  He is currently on the dose of 0.75 mg morning and afternoon, and 0.5 mg in the evening.  He plans to go up to 0.75 mg 3 times a day in the very near future.  He is working with one of our [facility] pharmacist on that.    He denies any complaints at this point.  He feels in a very good place even emotionally.    He continues to work with our genetic counselor, he just received the kit to be collected at home, he will collect the specimen and mail back to the company over the next few days.    His exam is very reassuring, he shows measurable improvement on his UPDRS.    We plan to continue same dose of pramipexole 0.75 mg 3 times daily as well as supportive care for now.  No further adjustments moving forward.  Will plan on seeing him back in 4 months, sooner if needed.  He is going to contact me back in the meantime if he needs anything.      The longitudinal plan of care for Tank was addressed during this visit. Due to the added complexity in care, I will continue to support Christiano Dong in the subsequent management of this condition(s) and with the ongoing continuity of care of this condition(s).    Attending attestation: Today a total 30 minutes were spent on this case, with greater than 50% of the time spent in face-to-face, examining, obtaining history, providing education  and coordination of care. Additional time was spent in chart review, ancillary data, and documentation as delineated above.      Again, thank you for allowing me to participate in the care of your patient.        Sincerely,        Louie Fields MD

## 2024-03-25 NOTE — PROGRESS NOTES
I, Louie Fields MD, personally interviewed, examined and evaluated this patient. I personally reviewed the vital signs, medications and pertinent labs/imaging. I discussed the patient with Dr. English and agree with the assessment, examination and plan of care documented, with the additions and/or exceptions delineated below:      Overall doing very well.  Tolerating pramipexole without any issues.  He is exercising more regularly, seeing a rehab also on a regular basis.  He feels he is at a point in which he does not need his pramipexole to be adjusted any further.  He is currently on the dose of 0.75 mg morning and afternoon, and 0.5 mg in the evening.  He plans to go up to 0.75 mg 3 times a day in the very near future.  He is working with one of our [facility] pharmacist on that.    He denies any complaints at this point.  He feels in a very good place even emotionally.    He continues to work with our genetic counselor, he just received the kit to be collected at home, he will collect the specimen and mail back to the company over the next few days.    His exam is very reassuring, he shows measurable improvement on his UPDRS.    We plan to continue same dose of pramipexole 0.75 mg 3 times daily as well as supportive care for now.  No further adjustments moving forward.  Will plan on seeing him back in 4 months, sooner if needed.  He is going to contact me back in the meantime if he needs anything.      The longitudinal plan of care for Tank was addressed during this visit. Due to the added complexity in care, I will continue to support Christiano Dong in the subsequent management of this condition(s) and with the ongoing continuity of care of this condition(s).    Attending attestation: Today a total 30 minutes were spent on this case, with greater than 50% of the time spent in face-to-face, examining, obtaining history, providing education and coordination of care. Additional time was spent in chart  review, ancillary data, and documentation as delineated above.

## 2024-03-25 NOTE — NURSING NOTE
"Christiano Dong is a 48 year old male who presents for:  Chief Complaint   Patient presents with    Parkinson     Follow Up 6 to 8 weeks          Initial Vitals:  /73   Pulse 83   SpO2 96%  Estimated body mass index is 23.71 kg/m  as calculated from the following:    Height as of 3/15/24: 1.803 m (5' 11\").    Weight as of 3/15/24: 77.1 kg (170 lb).. There is no height or weight on file to calculate BSA. BP completed using cuff size: regular  Data Unavailable    Nursing Comments:     Dolly Hunt MA   "

## 2024-03-25 NOTE — PROGRESS NOTES
Department of Neurology  Movement Disorders Division   Follow-up Note    Patient: Christiano Dong   MRN: [id number]   : 1975   Date of Visit: 2024     Chief Complaint:  Christiano Dong is a 48 year old male who returns to clinic for follow up of young-onset Parkinson's disease.     Interval History:  Mr. Dong was initially seen as a second opinion consult on 2024 at which time his exam was felt to be consistent with young-onset, tremor predominant Parkinson's disease. He was started on Pramipexole with initial goal dose of 0.5 mg TID. He was referred to [facility] pharmacy and met with Michelle Feng, Emily. He also met with Sajan Mcneil, genetic counselor, on 3/15/2024 and has opted to pursue genetic testing through the [facility] Parkinson's panel.     Mr. Dong presents by himself today.     Mr. Dong states that his tremor seems to be better than when he last saw Dr. Duran. It seems to be less frequent. He is not sure if this is because he is in a better place mentally or if the medication is helping somewhat.     He saw Michelle Feng who recommended increasing Pramipexole to 3 tabs with each dose. He is on the last increase.     He exercises 30-60 minutes daily - he does a combination of strength training, cardio, stretching. He is working with a  on balance as well which has been helpful. He has also been focusing more on his diet - eating more fruits, vegetables, nuts, seeds. He has cut out red meat and dairy. This has seemed to help his exercise as he feels he has less inflammation and is able to recover from work outs more easily.     He flew back from [place] this morning - he had been there for his son's football tournament. He just got his genetic testing kit mailed to him and will be sending that in.     Parkinson Disease Motor Symptom Review:    Motor fluctuations:     Dyskinesia:  Denies  Wearing off: Denies  Freezing of gait: Denies  Tremor: Left hand -  little better than before.   Rigidity: Little bit of stiffness in the toes - pretty infrequent (briefly a couple times a day), not clear if meds have helped.  Bradykinesia: Left hand is less dextrous - he is working with an occupational therapist which has been helpful. Pramipexole has maybe helped with typing.      Parkinson's Disease Non-motor Symptom Review:    Psychiatric disturbances - Mood has been really good. He denies feeling down, depressed or anxious. Exercise has been really helpful for mood.   Sleep disturbances - Sleep is pretty good. If he drinks wine he wakes every hour so he no longer is drinking alcohol. He wakes to urinate 2-3 times per night. Falls asleep easily after for the most part. He denies any dream enactment behavior - his wife has not mentioned anything.   GI symptoms - Little bit of constipation - mostly when traveling. No needing medications.   Urinary symptoms - Increased urinary urgency and frequency for a while. Drinks a lot of water when exercising.   Balance - Working with  on balance. No falls. Balance feels pretty good.   Pain - Denies  Autonomic dysfunction - Some lightheadedness when going from sitting to standing. He is not sure if this is maybe due to his blood sugar being low or possibly after a work out. Infrequent (once every 2-3 days), no loss of consciousness or significant functional impairment.   Hallucinations - Denies  Speech - Saw speech therapist at the beginning of the month. Will continue to work with them.   Swallowing - Denies concerns  Salivation - Denies concerns  Dopamine agonist side effects - Denies any new compulsions or impulsive behavior.       Current Medications:   Current Outpatient Medications   Medication Sig Dispense Refill    escitalopram (LEXAPRO) 20 MG tablet Take 20 mg by mouth daily      LORazepam (ATIVAN) 0.5 MG tablet Take 0.5 mg by mouth      pramipexole (MIRAPEX) 0.25 MG tablet 0.75mg QAM, 0.5mg QPM, 0.5mg at bedtime x 1 week,  then increase PM dose to 0.75mg x 1 week, then 0.75mg three times daily thereafter 270 tablet 3       PD Medications                   8am 12pm 6pm       Pramipexole 0.25 mg                                             3 3 2     He gets slightly nauseated if he doesn't take pramipexole with food sometimes.     Allergies: has No Known Allergies.    Past Medical History:  Past Medical History:   Diagnosis Date    Anxiety        Past Surgical History:  No past surgical history on file.    Social History:  Social History     Socioeconomic History    Marital status:     Years of education: 16   Occupational History    Occupation: [employer]   Tobacco Use    Smoking status: Never    Smokeless tobacco: Never   Substance and Sexual Activity    Alcohol use: Yes     Comment: has a glass of wine occassionally       Family History:  Family History   Problem Relation Age of Onset    Tremor Father     Multiple Sclerosis Sister     Parkinsonism Paternal Grandfather     Parkinsonism Paternal Uncle     Parkinsonism Cousin        Physical Exam:  The patient's  blood pressure is 116/73 and his pulse is 83. His oxygen saturation is 96%.      Last dose of medication: 0.75 mg pramipexole this morning around 4:30am    Neurological Examination:       3/25/2024    10:00 AM   UPDRS Motor Scale   Time: 10:50   Medication On   R Brain DBS: None   L Brain DBS: None   Dyskinesia (LID) No   Did LID interfere No   Speech 0   Facial Expression 1   Rigidity Neck 0   Rigidity RUE 1   Rigidity LUE 2   Rigidity RLE 0   Rigidity LLE 1   Finger Taps R 0   Finger Taps L 1   Hand Mvt R 0   Hand Mvt L 1   Pron-/Supinate R 0   Pron-/Supinate L 1   Toe Tap R 0   Toe Tap L 1   Leg Agility R 0   Leg Agility L 0   Arise From Chair 0   Gait 0   Gait Freezing 0   Postural Stability 0   Posture 0   Global Spont Mvt 0   Postural Tremor RUE 0   Postural Tremor LUE 1   Kinetic Tremor RUE 0   Kinetic Tremor LUE 0   Rest Tremor RUE 0   Rest Tremor LUE 2    Rest Tremor RLE 0   Rest Tremor LLE 0   Rest Tremor Lip/Jaw 0   Rest Tremor Constancy 1   Total Right 1   Total Left 10   Axial Total 1   Total 13       Data Reviewed:   Sajan Mcneil's 3/15/2024 office note    Michelle Mattson's 3/1/24 and 2/15/2024 visit notes.    Impression:  Christiano Dong is a 48 year old male with young-onset Parkinson's disease. Mr. Dong presents for follow up of Parkinson's disease. He reports that he is in a much better mental place now than he had been when he was initially seen. He has seen Sajan Mcneil in genetics and Michelle Feng, [facility] pharmacy. He will be sending in genetic testing kit to [facility] shortly. He has tolerated pramipexole and has noticed some mild benefit in terms of tremor and L hand dexterity. He has worked with physical, occupational and speech therapy.     His neurological exam is notable for improvement in left hand bradykinesia and tremor with UPDRS 13 (compared to 16 at his initial appointment). He has done an excellent job of getting consistent and variable exercise. He is happy with his current level of symptom control and would like to finish his planned increase to 0.75 mg pramipexole TID and then stay at that dose for the time being.     Recommendations:     - Continue pramipexole with goal dose 0.75 mg TID  - Encouraged Mr. Dong to continue getting consistent exercise  - [facility] soon, follow up results  - Continue working with physical, occupational, speech therapy    Follow up in 3-4 months, 30 minutes    Mr. Dong was seen and discussed with movement disorder attending, Dr. Renee English MD  Movement Disorders Fellow    CC: Parkinson's disease

## 2024-03-29 ENCOUNTER — THERAPY VISIT (OUTPATIENT)
Dept: PHYSICAL THERAPY | Facility: CLINIC | Age: 49
End: 2024-03-29
Payer: COMMERCIAL

## 2024-03-29 ENCOUNTER — THERAPY VISIT (OUTPATIENT)
Dept: SPEECH THERAPY | Facility: CLINIC | Age: 49
End: 2024-03-29
Payer: COMMERCIAL

## 2024-03-29 ENCOUNTER — THERAPY VISIT (OUTPATIENT)
Dept: OCCUPATIONAL THERAPY | Facility: CLINIC | Age: 49
End: 2024-03-29
Payer: COMMERCIAL

## 2024-03-29 DIAGNOSIS — R27.9 INCOORDINATION: ICD-10-CM

## 2024-03-29 DIAGNOSIS — R26.89 IMPAIRMENT OF BALANCE: ICD-10-CM

## 2024-03-29 DIAGNOSIS — G20.A1 PRIMARY PARKINSON'S DISEASE (H): Primary | ICD-10-CM

## 2024-03-29 DIAGNOSIS — G20.A1 YOUNG-ONSET PARKINSON'S DISEASE (H): Primary | ICD-10-CM

## 2024-03-29 DIAGNOSIS — R27.8 COORDINATION IMPAIRMENT: ICD-10-CM

## 2024-03-29 DIAGNOSIS — R49.8 HYPOPHONIA: ICD-10-CM

## 2024-03-29 PROCEDURE — 97110 THERAPEUTIC EXERCISES: CPT | Mod: GO

## 2024-03-29 PROCEDURE — 97750 PHYSICAL PERFORMANCE TEST: CPT | Mod: GP | Performed by: PHYSICAL THERAPIST

## 2024-03-29 PROCEDURE — 92507 TX SP LANG VOICE COMM INDIV: CPT | Mod: GN

## 2024-03-29 PROCEDURE — 97110 THERAPEUTIC EXERCISES: CPT | Mod: GP | Performed by: PHYSICAL THERAPIST

## 2024-03-29 NOTE — PROGRESS NOTES
03/29/24 0900   Signing Clinician's Name / Credentials   Signing clinician's name / credentials RUDDY Rogers DPT   HiMAT: HIGH LEVEL MOBILITY ASSESSMENT MERON Rodriguez, RICHARD Bob, et al. (2004).   1.     Walk 4  (3.9)   2.     Walk backward 4  (5.1)   3.     Walk on Toes 4  (3.6)   4.     Walk over Obstacle 4  (3.5)   5.     Run 2  (2.28)   6.     Skip 1  (4.3)   7.     Hop Forward (Affected) 4  (3.6 on L, 4.75 on R)   8.     Bound (Affected) 4  (142 (R>L))   9.     Bound (Less Affected) 4  (155 (L>R))   10b. Up Stairs Independent (No Rail AND reciprocal: if not, score 0 and rate above) 3  (7.3)   11b. Down Stairs Independent (No rail AND reciprocal: if not, score 0 and rate above) 4  (5.2)   TOTAL HiMAT SCORE (out of 54) 38     HiMAT: The HiMAT assesses dynamic higher level balance tasks. No assistive device to be used for this test.     Patient Score: 38/54    No cutoff scores have been established for the HiMAT. In order to be 95% confident that a real change has occurred the patient needs to have deteriorated by 2 points or improved by 4 points.    Assessment (rationale for performing, application to patient s function & care plan): The pt had more difficulty with coordination tasks.    Minutes billed as physical performance test: 10

## 2024-04-02 ENCOUNTER — HOSPITAL ENCOUNTER (OUTPATIENT)
Dept: GENERAL RADIOLOGY | Facility: CLINIC | Age: 49
Discharge: HOME | End: 2024-04-02
Attending: PSYCHIATRY & NEUROLOGY | Admitting: PSYCHIATRY & NEUROLOGY
Payer: COMMERCIAL

## 2024-04-02 ENCOUNTER — HOSPITAL ENCOUNTER (OUTPATIENT)
Dept: SPEECH THERAPY | Facility: CLINIC | Age: 49
Setting detail: THERAPIES SERIES
Discharge: HOME | End: 2024-04-02
Attending: PSYCHIATRY & NEUROLOGY
Payer: COMMERCIAL

## 2024-04-02 DIAGNOSIS — G20.A1 PARKINSON DISEASE (H): ICD-10-CM

## 2024-04-02 DIAGNOSIS — G20.A1 YOUNG-ONSET PARKINSON'S DISEASE (H): ICD-10-CM

## 2024-04-02 PROCEDURE — 92611 MOTION FLUOROSCOPY/SWALLOW: CPT | Mod: GN | Performed by: SPEECH-LANGUAGE PATHOLOGIST

## 2024-04-02 PROCEDURE — 74230 X-RAY XM SWLNG FUNCJ C+: CPT

## 2024-04-02 NOTE — PROGRESS NOTES
SPEECH LANGUAGE PATHOLOGY EVALUATION    See electronic medical record for Abuse and Falls Screening details.    Subjective      Presenting condition or subjective complaint: This 48 year old patient was referred for an Outpatient Video Swallow Study to obtain objective date on pt's baseline swallow function. Pt denied any  changes in swallow function or any dysphagia symptoms. Pt is following SLP for voice tx. Per chart review, tremor started in March 2023 with Parkinson's diagnosis on 1/23/2024. Pt is being followed by Neurology at the Movement Disorders Division.    Objective     SWALLOW EVALUTION  Current Diet/Method of Nutritional Intake: thin liquids (level 0), regular diet      VIDEOFLUOROSCOPIC SWALLOW STUDY  Radiologist: Dr. Cancino  Views Taken: left lateral   VFSS textures trialed:   VFSS Eval: Thin Liquids  Mode of Presentation: cup, straw, self-fed   Preparatory Phase: WFL  Oral Phase: WFL  Bolus Location When Swallow Initiated: posterior angle of ramus  Pharyngeal Phase: WFL  Rosenbeck's Penetration Aspiration Scale: 1 - no aspiration, contrast does not enter airway    Diagnostic Statement: WFL; no penetration, aspiration or pharyngeal residue    VFSS Eval: Purees  Mode of Presentation: cup, straw, self-fed   Preparatory Phase: WFL  Oral Phase: WFL  Bolus Location When Swallow Initiated: posterior angle of ramus  Pharyngeal Phase: WFL  Rosenbeck's Penetration Aspiration Scale: 1 - no aspiration, contrast does not enter airway    Diagnostic Statement: WFL; no penetration, aspiration or pharyngeal residue    VFSS Eval: Solids  Mode of Presentation: cup, straw, self-fed   Preparatory Phase: WFL  Oral Phase: WFL  Bolus Location When Swallow Initiated: posterior angle of ramus  Pharyngeal Phase: WFL  Rosenbeck's Penetration Aspiration Scale: 1 - no aspiration, contrast does not enter airway    Diagnostic Statement: WFL; no penetration, aspiration or pharyngeal residue    ESOPHAGEAL PHASE OF SWALLOW  no observed  or reported concerns related to esophageal function     SWALLOW ASSESSMENT CLINICAL IMPRESSIONS AND RATIONALE  Diet Consistency Recommendations: thin liquids (level 0), regular diet      Assessment & Plan   CLINICAL IMPRESSIONS   Medical Diagnosis:    Parkinson disease (H) [G20.A1]  Treatment Diagnosis:    No appreciable dysphagia  Impression/Assessment: SLP: Pt presents with a functional oral and pharyngeal phase of swallow on his Video Swallow Study today. Thin liquids via single sip and consecutive gulp, puree solids and regular solids trialed. Adequate oral control, timing, tongue  base retraction, epiglottic inversion and pharyngeal constriction throughout. No penetration, aspiration or pharyngeal residue. Reviewed images with the patient. Education provided on dysphagia symptoms, self monitoring and when to repeat VFSS if symptoms arise. Pt verbalized understanding. Plan to continue OP SLP for voice tx. No further SLP services indicated for swallowing. Evaluation only.     Risks and benefits of evaluation/treatment have been explained.   Patient/Family/caregiver agrees with Plan of Care.     Evaluation Time:  25         Signing Clinician: Millicent Cardenas, SLP

## 2024-04-24 NOTE — PROGRESS NOTES
03/29/24 0500   Appointment Info   Signing clinician's name / credentials RUDDY Rogers DPT   Visits Used 2   Medical Diagnosis Parkinson's disease   PT Tx Diagnosis impairment of balance, coordination   Progress Note/Certification   Onset of illness/injury or Date of Surgery 02/08/24   Therapy Frequency 1x/wk   Predicted Duration up to 90 days   PT Goal 1   Goal Identifier HiMAT   Goal Description Pt will score >49/54 on the HiMAT in order to improve high level balance for community activities and hobbies   Rationale to maximize safety and independence within the community   Goal Progress 38/54   Target Date 06/03/24   PT Goal 2   Goal Identifier HEP   Goal Description Pt will be independent with a HEP to self manage symptoms   Target Date 06/03/24   Subjective Report   Subjective Report Doing well, still feels differences between hips.   Therapeutic Procedure/Exercise   Therapeutic Procedures: strength, endurance, ROM, flexibility minutes (62980) 25   Ther Proc 1 - Details Hip stretching in supine: figure 4/piriformis stretch, but modified to completed prone pidgeon stretch, holding x 1 min/side, Changing positions through high plank position. Also 1/2 kneeling hip flexor stretch, reaching arm up overhead to increase stretch.   Ther Proc 2 Pliometrics   Ther Proc 2 - Details Completed large amplitude pliometric training. Included lateral skater hops, focusing on large amplitude jump and core stability/balance with land, 2 x 30 sec. Also forward skaters 20' x 2 which was not as challenging. Jump lunges x 5 reps, then lateral skater to jump lunge, 2 x 25 sec. Fatigued following. Ended with dual tasking basketball dribbles- R hand only and moving to color cone called out by PT, x 1 min, L hand x 1 min, then alteranting hands x 1 min. Pt devon well.   Ther Proc 1 stretching   Physical Performance Test/measures   Physical Performance Test/Measurement, Minutes (36727) 10   Physical Performance Test/Measurement Details  Completed HiMAT, see results   Patient Response/Progress More difficulty with coordination tasks such as skipping   Plan   Plan for next session continue dual tasking, agility ladder, blaze pods, PWR as needed   Total Session Time   Timed Code Treatment Minutes 35   Total Treatment Time (sum of timed and untimed services) 35         DISCHARGE  Reason for Discharge: Patient chooses to discontinue therapy.        Discharge Plan: Patient to continue home program and personal training    Referring Provider:  Abdiel Leonard

## 2024-05-02 ENCOUNTER — THERAPY VISIT (OUTPATIENT)
Dept: SPEECH THERAPY | Facility: CLINIC | Age: 49
End: 2024-05-02
Payer: COMMERCIAL

## 2024-05-02 DIAGNOSIS — R49.8 HYPOPHONIA: ICD-10-CM

## 2024-05-02 DIAGNOSIS — G20.A1 YOUNG-ONSET PARKINSON'S DISEASE (H): Primary | ICD-10-CM

## 2024-05-02 PROCEDURE — 92507 TX SP LANG VOICE COMM INDIV: CPT | Mod: GN

## 2024-05-24 ENCOUNTER — THERAPY VISIT (OUTPATIENT)
Dept: SPEECH THERAPY | Facility: CLINIC | Age: 49
End: 2024-05-24
Payer: COMMERCIAL

## 2024-05-24 DIAGNOSIS — G20.A1 YOUNG-ONSET PARKINSON'S DISEASE (H): ICD-10-CM

## 2024-05-24 DIAGNOSIS — R49.8 HYPOPHONIA: Primary | ICD-10-CM

## 2024-05-24 PROCEDURE — 92507 TX SP LANG VOICE COMM INDIV: CPT | Mod: GN | Performed by: STUDENT IN AN ORGANIZED HEALTH CARE EDUCATION/TRAINING PROGRAM

## 2024-05-30 ENCOUNTER — THERAPY VISIT (OUTPATIENT)
Dept: SPEECH THERAPY | Facility: CLINIC | Age: 49
End: 2024-05-30
Payer: COMMERCIAL

## 2024-05-30 DIAGNOSIS — G20.A1 YOUNG-ONSET PARKINSON'S DISEASE (H): ICD-10-CM

## 2024-05-30 DIAGNOSIS — R49.8 HYPOPHONIA: Primary | ICD-10-CM

## 2024-05-30 PROCEDURE — 92507 TX SP LANG VOICE COMM INDIV: CPT | Mod: GN | Performed by: STUDENT IN AN ORGANIZED HEALTH CARE EDUCATION/TRAINING PROGRAM

## 2024-06-12 DIAGNOSIS — G20.A1 YOUNG-ONSET PARKINSON'S DISEASE (H): ICD-10-CM

## 2024-06-13 RX ORDER — PRAMIPEXOLE DIHYDROCHLORIDE 0.25 MG/1
TABLET ORAL
Qty: 270 TABLET | Refills: 3 | OUTPATIENT
Start: 2024-06-13

## 2024-06-13 NOTE — TELEPHONE ENCOUNTER
Pt should have enough until appt 6/24/24.  Will deny and send message to pharmacy.     Jayleen HORN RN, BSN  [facility]

## 2024-06-19 DIAGNOSIS — G20.A1 YOUNG-ONSET PARKINSON'S DISEASE (H): ICD-10-CM

## 2024-06-20 RX ORDER — PRAMIPEXOLE DIHYDROCHLORIDE 0.25 MG/1
0.75 TABLET ORAL 3 TIMES DAILY
Qty: 270 TABLET | Refills: 3 | OUTPATIENT
Start: 2024-06-20

## 2024-06-20 NOTE — TELEPHONE ENCOUNTER
Should have enough to last until appt 6/24.     4 month supply sent 3/1/24.     Jayleen HORN RN, BSN  [facility]

## 2024-06-21 ENCOUNTER — TELEPHONE (OUTPATIENT)
Dept: NEUROLOGY | Facility: CLINIC | Age: 49
End: 2024-06-21
Payer: COMMERCIAL

## 2024-06-21 NOTE — TELEPHONE ENCOUNTER
Attempted to reach patient to remind them about appointment scheduled with Louie Fields MD on 6/24/24 in our [place] location.   No one answered the call and no option to leave voice message.

## 2024-06-28 ENCOUNTER — OFFICE VISIT (OUTPATIENT)
Dept: NEUROLOGY | Facility: CLINIC | Age: 49
End: 2024-06-28
Payer: COMMERCIAL

## 2024-06-28 VITALS
WEIGHT: 172.2 LBS | OXYGEN SATURATION: 99 % | SYSTOLIC BLOOD PRESSURE: 121 MMHG | BODY MASS INDEX: 24.02 KG/M2 | DIASTOLIC BLOOD PRESSURE: 64 MMHG | HEART RATE: 71 BPM

## 2024-06-28 DIAGNOSIS — G20.A1 YOUNG-ONSET PARKINSON'S DISEASE (H): Primary | ICD-10-CM

## 2024-06-28 PROCEDURE — 99215 OFFICE O/P EST HI 40 MIN: CPT | Performed by: PSYCHIATRY & NEUROLOGY

## 2024-06-28 PROCEDURE — G2211 COMPLEX E/M VISIT ADD ON: HCPCS | Performed by: PSYCHIATRY & NEUROLOGY

## 2024-06-28 ASSESSMENT — UNIFIED PARKINSONS DISEASE RATING SCALE (UPDRS)
SPONTANEITY_OF_MOVEMENT: (0) NORMAL: NO PROBLEMS.
PRONATION_SUPINATION_LEFT: (1) SLIGHT: ANY OF THE FOLLOWING: A) THE REGULAR RHYTHM IS BROKEN WITH ONE WITH ONE OR TWO INTERRUPTIONS OR HESITATIONS OF THE MOVEMENT  B) SLIGHT SLOWING  C) THE AMPLITUDE DECREMENTS NEAR THE END OF THE 10 MOVEMENTS.
PRONATION_SUPINATION_RIGHT: (0) NORMAL: NO PROBLEMS.
TOTAL_SCORE: 0
CONSTANCY_TREMOR_ATREST: (1) SLIGHT: TREMOR AT REST IS PRESENT 25% OF THE ENTIRE EXAMINATION PERIOD.
HANDMOVEMENTS_RIGHT: (0) NORMAL: NO PROBLEMS.
MOVEMENTS_INTERFERE_WITH_RATINGS: NO
FINGER_TAPPING_RIGHT: (0) NORMAL: NO PROBLEMS.
LEG_AGILITY_LEFT: (0) NORMAL: NO PROBLEMS.
LEG_AGILITY_RIGHT: (0) NORMAL: NO PROBLEMS.
RIGIDITY_NECK: (0) NORMAL: NO RIGIDITY.
RIGIDITY_RUE: (0) NORMAL: NO RIGIDITY.
FACIAL_EXPRESSION: (1) SLIGHT: MINIMAL MASKED FACIES MANIFESTED ONLY BY DECREASED FREQUENCY OF BLINKING.
RIGIDITY_RLE: (0) NORMAL: NO RIGIDITY.
PARKINSONS_MEDS: ON
FREEZING_GAIT: (0) NORMAL: NO FREEZING.
TOETAPPING_RIGHT: (0) NORMAL: NO PROBLEMS.
RIGIDITY_LLE: (1) SLIGHT: RIGIDITY ONLY DETECTED WITH ACTIVATION MANEUVER.
DYSKINESIAS_PRESENT: NO
RIGIDITY_LUE: (2) MILD: RIGIDITY DETECTED WITHOUT THE ACTIVATION MANEUVER. FULL RANGE OF MOTION IS EASILY ACHIEVED.
POSTURE: (0) NORMAL: NO PROBLEMS.
FINGER_TAPPING_LEFT: (1) SLIGHT: ANY OF THE FOLLOWING: A) THE REGULAR RHYTHM IS BROKEN WITH ONE WITH ONE OR TWO INTERRUPTIONS OR HESITATIONS OF THE MOVEMENT  B) SLIGHT SLOWING  C) THE AMPLITUDE DECREMENTS NEAR THE END OF THE 10 MOVEMENTS.
AMPLITUDE_RUE: (0) NORMAL: NO TREMOR.
AMPLITUDE_LUE: (2) MILD: > 1 CM BUT < 3 CM IN MAXIMAL AMPLITUDE.
AMPLITUDE_LIP_JAW: (0) NORMAL: NO TREMOR.
TOTAL_SCORE: 13
TOTAL_SCORE_LEFT: 11
AXIAL_SCORE: 1
AMPLITUDE_RLE: (0) NORMAL: NO TREMOR.
SPEECH: (0) NORMAL: NO SPEECH PROBLEMS.
AMPLITUDE_LLE: (0) NORMAL: NO TREMOR.
POSTURAL_STABILITY: (0) NORMAL: NO PROBLEMS. RECOVERS WITH ONE OR TWO STEPS.
ARISING_CHAIR: (0) NORMAL: NO PROBLEMS. ABLE TO ARISE QUICKLY WITHOUT HESITATION.
HANDMOVEMENTS_LEFT: (2) MILD: ANY OF THE FOLLOWING: A) 3 TO 5 INTERRUPTIONS DURING TAPPING  B) MILD SLOWING  C) THE AMPLITUDE DECREMENTS MIDWAY IN THE 10-MOVEMENT SEQUENCE.
GAIT: (0) NORMAL: NO PROBLEMS.
TOETAPPING_LEFT: (1) SLIGHT: ANY OF THE FOLLOWING: A) THE REGULAR RHYTHM IS BROKEN WITH ONE WITH ONE OR TWO INTERRUPTIONS OR HESITATIONS OF THE MOVEMENT  B) SLIGHT SLOWING  C) THE AMPLITUDE DECREMENTS NEAR THE END OF THE 10 MOVEMENTS.

## 2024-06-28 NOTE — PROGRESS NOTES
"Department of Neurology  Movement Disorders Division   Return Patient Visit    Patient: Christiano Dong   MRN: [id number]   : 1975   Date of Visit: 2024  PCP: Rodger Baer MD   Referring provider: Adarsh Fields    CC:  Return for young onset Parkinson's disease    Interval history:  Mr. Dong is a 48 year old male with history significant for recently diagnosed young onset Parkinson disease, as well as comorbid anxiety disorder who presents to movement disorder clinic for follow-up. Last visit was 2024, with a plan to continue Mirapex and supportive care.     Overall he is done really well.  He does endorse some very seldom breakthrough events in which he seems like almost that his medicines are not working at 100% and and the tremors can be a little more apparent.  Again, those are not very frequent, nor have a specific pattern that he has been able to pin down.  He suspects that he might be somewhat linked to him being either too excited or too nervous or too anxious about something that might be happening, and the tremors may be present for perhaps a few minutes to a few hours and then the eventually subside.    He is exercising very regularly and very intensely and he does feel that when he exercises he feels great mentally and physically.    From a mood standpoint he does endorse that he is in a \"good place\".  He actually inquires regarding the dose of his Lexapro, which is currently at 20 mg/day.  He does share with me today that he has longstanding anxiety and Lexapro has been very effective in suppressing anxious symptoms.  At 1 point that he was at 10 mg and he was experiencing a significant amount of anxiety, he endorses taking 15 mg which was doing okay and then about 2 to 3 years ago he went through a very stressful period of his life that prompted his primary care physician to bump it up to 20.  He wonders whether he could potentially tolerate going back down to 15 " mg and I encouraged him to discuss that with his primary care physician which seems pretty reasonable since he feels very well.    No side effects to report with Mirapex.  He is currently taking still a very low-dose, 0.25 mg tablets, which he takes 3 tablets in the morning, 2 tablets in the afternoon, 2 tablets in the evening.  He does not seem like he is wearing off.  He sleeps very well without any breaks in sleep cycle.  There is no nausea, somnolence, behavioral changes, or any other complaints to report.    No falls, swallowing problems, or any major speech complaints.  He has completed the course of physical, occupational, and speech and swallow therapies.  He does acknowledge that there are times that he cannot hold his specific tone when he is trying to speak, but he has been doing the exercises provided by our speech pathologist and he seems like those are been very helpful.    Other than that no other complaints to be discussed today no other issues.    ROS:  All others negative except as listed above. Pertinent movement disorders-specific ROS listed above.    Past Medical History:   Diagnosis Date    Anxiety         No past surgical history on file.       Current Outpatient Medications:     escitalopram (LEXAPRO) 20 MG tablet, Take 20 mg by mouth daily, Disp: , Rfl:     pramipexole (MIRAPEX) 0.25 MG tablet, 0.75mg QAM, 0.5mg QPM, 0.5mg at bedtime x 1 week, then increase PM dose to 0.75mg x 1 week, then 0.75mg three times daily thereafter, Disp: 270 tablet, Rfl: 3    LORazepam (ATIVAN) 0.5 MG tablet, Take 0.5 mg by mouth (Patient not taking: Reported on 6/28/2024), Disp: , Rfl:      No Known Allergies     Family History   Problem Relation Age of Onset    Tremor Father     Multiple Sclerosis Sister     Parkinsonism Paternal Grandfather     Parkinsonism Paternal Uncle     Parkinsonism Cousin         Social History:  He  reports that he has never smoked. He has never used smokeless tobacco. He reports  current alcohol use.     PHYSICAL EXAM:  /64   Pulse 71   Wt 78.1 kg (172 lb 3.2 oz)   SpO2 99%   BMI 24.02 kg/m       NEURO:  UPDRS  Time:: 1135  Medication: On  R Brain DBS:: None  L Brain DBS:: None  Dyskinesia (LID): No  Did LID interfere: No  Speech: (0) Normal: No speech problems.  Facial Expression: (1) Slight: Minimal masked facies manifested only by decreased frequency of blinking.  Rigidity Neck: (0) Normal: No rigidity.  Rigidity RUE: (0) Normal: No rigidity.  Rigidity LUE: (2) Mild: Rigidity detected without the activation maneuver. Full range of motion is easily achieved.  Rigidity RLE: (0) Normal: No rigidity.  Rigidity LLE: (1) Slight: Rigidity only detected with activation maneuver.  Finger Taps R: (0) Normal: No problems.  Finger Taps L: (1) Slight: Any of the following: a) the regular rhythm is broken with one with one or two interruptions or hesitations of the movement  b) slight slowing  c) the amplitude decrements near the end of the 10 movements.  Hand Mvt R: (0) Normal: No problems.  Hand Mvt L: (2) Mild: Any of the following: a) 3 to 5 interruptions during tapping  b) mild slowing  c) the amplitude decrements midway in the 10-movement sequence.  Pron-/Supinate R: (0) Normal: No problems.  Pron-/Supinate L: (1) Slight: Any of the following: a) the regular rhythm is broken with one with one or two interruptions or hesitations of the movement  b) slight slowing  c) the amplitude decrements near the end of the 10 movements.  Toe Tap R: (0) Normal: No problems.  Toe Tap L: (1) Slight: Any of the following: a) the regular rhythm is broken with one with one or two interruptions or hesitations of the movement  b) slight slowing  c) the amplitude decrements near the end of the 10 movements.  Leg Agility R: (0) Normal: No problems.  Leg Agility L: (0) Normal: No problems.  Arise From Chair: (0) Normal: No problems. Able to arise quickly without hesitation.  Gait: (0) Normal: No  problems.  Gait Freezing: (0) Normal: No freezing.  Postural Stability: (0) Normal: No problems. Recovers with one or two steps.  Posture: (0) Normal: No problems.  Global Spont Mvt: (0) Normal: No problems.  Postural Tremor RUE: (0) Normal: No tremor.  Postural Tremor LUE: (1) Slight: Tremor is present but less than 1 cm in amplitude.  Kinetic Tremor RUE: (0) Normal: No tremor.  Kinetic Tremor LUE: (0) Normal: No tremor.  Rest Tremor RUE: (0) Normal: No tremor.  Rest Tremor LUE: (2) Mild: > 1 cm but < 3 cm in maximal amplitude.  Rest Tremor RLE: (0) Normal: No tremor.  Rest Tremor LLE: (0) Normal: No tremor.  Rest Tremor Lip/Jaw: (0) Normal: No tremor.  Rest Tremor Constancy: (1) Slight: Tremor at rest is present 25% of the entire examination period.  Total Right: 0  Total Left: 11  Axial Total: 1  Total: 13    DATA REVIEWED:  Reviewed all the pertinent data available in [facility].    ASSESSMENT:  48-year-old male with young onset Parkinson's disease, here for follow-up.  No clear interval worsening in symptoms or anything else to suggest a clear disease progression.  I do suspect that some of the breakthrough symptoms he might be experiencing could be very well linked to external factors exacerbating symptoms temporarily.    PLAN:  -We reviewed impression and plan with patient    - We will continue the same dosage of Mirapex for the time being.  We could increase to 3 tablets 3 times a day as per original plan, however he feels very well right now so we can keep everything the same for the time being.    - We did discuss all the potential strategies, he was encouraged to keep a diary and during the times that he is feeling more of the breakthrough symptoms he should try to retrace his steps during that day and the night prior, watch what he had potentially be eaten what can of stressful situations he was exposed to, or whether there were any issues with the sleep the night prior.  He will do that moving forward and let  me know when there are any patterns that we could potentially try to address to avoid exacerbations.  We also talked about the fact that he is still working and he has a very busy schedule, so if some of the breakthrough events appear to be related to potential inconsistencies with timing of meds or potential interactions with a given meal that he may have, perhaps 1 strategy could be to try to convert his immediate release pramipexole to extended release pramipexole tablets so we could potentially try to keep steady levels of the drug and his blood and his brain.  He will think about that and if he seems like there is a need for it he will let me know which I think is a reasonable plan.    - Continue exercising regularly as he is already doing.  He had some very pertinent questions regarding diet and other lifestyle modifications which we spent some time going over them.    - Since he is doing very well right now, we will plan on regrouping in the next 4 to 6 months.  Sooner if needed.  He was encouraged to contact them back in the meantime should there be any questions, concerns, or any other issues that may arise until then.    There are no Patient Instructions on file for this visit.      Louie Duran MD (Leo) (he/him/his)   of Neurology  [facility]  Department of Neurology      Thank you for referring Christiano Dong to our [facility] Movement Disorders Program, we appreciate the opportunity of being your partner in healthcare. Please feel free to reach out to us at any point if any questions or concerns about this visit.    Attending attestation: Today I spent a total 45 minutes on this case, in face-to-face, examining, obtaining history, providing education and coordination of care. Additional time was spent in chart review, ancillary data, and documentation as delineated above.    The longitudinal plan of care for Tank was addressed during this visit. Due to the added  complexity in care, I will continue to support Christiano Dong in the subsequent management of this condition(s) and with the ongoing continuity of care of this condition(s).

## 2024-06-28 NOTE — LETTER
"2024      Christiano Dong  [address]  [place] [id number]      Dear Colleague,    Thank you for referring your patient, Christiano Dong, to the [facility]. Please see a copy of my visit note below.    Department of Neurology  Movement Disorders Division   Return Patient Visit    Patient: Christiano Dong   MRN: [id number]   : 1975   Date of Visit: 2024  PCP: Rodger Baer MD   Referring provider: Adarsh Fields    CC:  Return for young onset Parkinson's disease    Interval history:  Mr. Dong is a 48 year old male with history significant for recently diagnosed young onset Parkinson disease, as well as comorbid anxiety disorder who presents to movement disorder clinic for follow-up. Last visit was 2024, with a plan to continue Mirapex and supportive care.     Overall he is done really well.  He does endorse some very seldom breakthrough events in which he seems like almost that his medicines are not working at 100% and and the tremors can be a little more apparent.  Again, those are not very frequent, nor have a specific pattern that he has been able to pin down.  He suspects that he might be somewhat linked to him being either too excited or too nervous or too anxious about something that might be happening, and the tremors may be present for perhaps a few minutes to a few hours and then the eventually subside.    He is exercising very regularly and very intensely and he does feel that when he exercises he feels great mentally and physically.    From a mood standpoint he does endorse that he is in a \"good place\".  He actually inquires regarding the dose of his Lexapro, which is currently at 20 mg/day.  He does share with me today that he has longstanding anxiety and Lexapro has been very effective in suppressing anxious symptoms.  At 1 point that he was at 10 mg and he was experiencing a significant amount of anxiety, he " endorses taking 15 mg which was doing okay and then about 2 to 3 years ago he went through a very stressful period of his life that prompted his primary care physician to bump it up to 20.  He wonders whether he could potentially tolerate going back down to 15 mg and I encouraged him to discuss that with his primary care physician which seems pretty reasonable since he feels very well.    No side effects to report with Mirapex.  He is currently taking still a very low-dose, 0.25 mg tablets, which he takes 3 tablets in the morning, 2 tablets in the afternoon, 2 tablets in the evening.  He does not seem like he is wearing off.  He sleeps very well without any breaks in sleep cycle.  There is no nausea, somnolence, behavioral changes, or any other complaints to report.    No falls, swallowing problems, or any major speech complaints.  He has completed the course of physical, occupational, and speech and swallow therapies.  He does acknowledge that there are times that he cannot hold his specific tone when he is trying to speak, but he has been doing the exercises provided by our speech pathologist and he seems like those are been very helpful.    Other than that no other complaints to be discussed today no other issues.    ROS:  All others negative except as listed above. Pertinent movement disorders-specific ROS listed above.    Past Medical History:   Diagnosis Date     Anxiety         No past surgical history on file.       Current Outpatient Medications:      escitalopram (LEXAPRO) 20 MG tablet, Take 20 mg by mouth daily, Disp: , Rfl:      pramipexole (MIRAPEX) 0.25 MG tablet, 0.75mg QAM, 0.5mg QPM, 0.5mg at bedtime x 1 week, then increase PM dose to 0.75mg x 1 week, then 0.75mg three times daily thereafter, Disp: 270 tablet, Rfl: 3     LORazepam (ATIVAN) 0.5 MG tablet, Take 0.5 mg by mouth (Patient not taking: Reported on 6/28/2024), Disp: , Rfl:      No Known Allergies     Family History   Problem Relation Age of  Onset     Tremor Father      Multiple Sclerosis Sister      Parkinsonism Paternal Grandfather      Parkinsonism Paternal Uncle      Parkinsonism Cousin         Social History:  He  reports that he has never smoked. He has never used smokeless tobacco. He reports current alcohol use.     PHYSICAL EXAM:  /64   Pulse 71   Wt 78.1 kg (172 lb 3.2 oz)   SpO2 99%   BMI 24.02 kg/m       NEURO:  UPDRS  Time:: 1135  Medication: On  R Brain DBS:: None  L Brain DBS:: None  Dyskinesia (LID): No  Did LID interfere: No  Speech: (0) Normal: No speech problems.  Facial Expression: (1) Slight: Minimal masked facies manifested only by decreased frequency of blinking.  Rigidity Neck: (0) Normal: No rigidity.  Rigidity RUE: (0) Normal: No rigidity.  Rigidity LUE: (2) Mild: Rigidity detected without the activation maneuver. Full range of motion is easily achieved.  Rigidity RLE: (0) Normal: No rigidity.  Rigidity LLE: (1) Slight: Rigidity only detected with activation maneuver.  Finger Taps R: (0) Normal: No problems.  Finger Taps L: (1) Slight: Any of the following: a) the regular rhythm is broken with one with one or two interruptions or hesitations of the movement  b) slight slowing  c) the amplitude decrements near the end of the 10 movements.  Hand Mvt R: (0) Normal: No problems.  Hand Mvt L: (2) Mild: Any of the following: a) 3 to 5 interruptions during tapping  b) mild slowing  c) the amplitude decrements midway in the 10-movement sequence.  Pron-/Supinate R: (0) Normal: No problems.  Pron-/Supinate L: (1) Slight: Any of the following: a) the regular rhythm is broken with one with one or two interruptions or hesitations of the movement  b) slight slowing  c) the amplitude decrements near the end of the 10 movements.  Toe Tap R: (0) Normal: No problems.  Toe Tap L: (1) Slight: Any of the following: a) the regular rhythm is broken with one with one or two interruptions or hesitations of the movement  b) slight slowing  c)  the amplitude decrements near the end of the 10 movements.  Leg Agility R: (0) Normal: No problems.  Leg Agility L: (0) Normal: No problems.  Arise From Chair: (0) Normal: No problems. Able to arise quickly without hesitation.  Gait: (0) Normal: No problems.  Gait Freezing: (0) Normal: No freezing.  Postural Stability: (0) Normal: No problems. Recovers with one or two steps.  Posture: (0) Normal: No problems.  Global Spont Mvt: (0) Normal: No problems.  Postural Tremor RUE: (0) Normal: No tremor.  Postural Tremor LUE: (1) Slight: Tremor is present but less than 1 cm in amplitude.  Kinetic Tremor RUE: (0) Normal: No tremor.  Kinetic Tremor LUE: (0) Normal: No tremor.  Rest Tremor RUE: (0) Normal: No tremor.  Rest Tremor LUE: (2) Mild: > 1 cm but < 3 cm in maximal amplitude.  Rest Tremor RLE: (0) Normal: No tremor.  Rest Tremor LLE: (0) Normal: No tremor.  Rest Tremor Lip/Jaw: (0) Normal: No tremor.  Rest Tremor Constancy: (1) Slight: Tremor at rest is present 25% of the entire examination period.  Total Right: 0  Total Left: 11  Axial Total: 1  Total: 13    DATA REVIEWED:  Reviewed all the pertinent data available in [facility].    ASSESSMENT:  48-year-old male with young onset Parkinson's disease, here for follow-up.  No clear interval worsening in symptoms or anything else to suggest a clear disease progression.  I do suspect that some of the breakthrough symptoms he might be experiencing could be very well linked to external factors exacerbating symptoms temporarily.    PLAN:  -We reviewed impression and plan with patient    - We will continue the same dosage of Mirapex for the time being.  We could increase to 3 tablets 3 times a day as per original plan, however he feels very well right now so we can keep everything the same for the time being.    - We did discuss all the potential strategies, he was encouraged to keep a diary and during the times that he is feeling more of the breakthrough symptoms he should try to  retrace his steps during that day and the night prior, watch what he had potentially be eaten what can of stressful situations he was exposed to, or whether there were any issues with the sleep the night prior.  He will do that moving forward and let me know when there are any patterns that we could potentially try to address to avoid exacerbations.  We also talked about the fact that he is still working and he has a very busy schedule, so if some of the breakthrough events appear to be related to potential inconsistencies with timing of meds or potential interactions with a given meal that he may have, perhaps 1 strategy could be to try to convert his immediate release pramipexole to extended release pramipexole tablets so we could potentially try to keep steady levels of the drug and his blood and his brain.  He will think about that and if he seems like there is a need for it he will let me know which I think is a reasonable plan.    - Continue exercising regularly as he is already doing.  He had some very pertinent questions regarding diet and other lifestyle modifications which we spent some time going over them.    - Since he is doing very well right now, we will plan on regrouping in the next 4 to 6 months.  Sooner if needed.  He was encouraged to contact them back in the meantime should there be any questions, concerns, or any other issues that may arise until then.    There are no Patient Instructions on file for this visit.      Louie Duran MD (Leo) (he/him/his)   of Neurology  [facility]  Department of Neurology      Thank you for referring Christiano Dong to our [facility] Movement Disorders Program, we appreciate the opportunity of being your partner in healthcare. Please feel free to reach out to us at any point if any questions or concerns about this visit.    Attending attestation: Today I spent a total 45 minutes on this case, in face-to-face, examining, obtaining  history, providing education and coordination of care. Additional time was spent in chart review, ancillary data, and documentation as delineated above.    The longitudinal plan of care for Tank was addressed during this visit. Due to the added complexity in care, I will continue to support Christiano MOLINA Letitia in the subsequent management of this condition(s) and with the ongoing continuity of care of this condition(s).      Again, thank you for allowing me to participate in the care of your patient.        Sincerely,        Louie Fields MD

## 2024-08-19 NOTE — PROGRESS NOTES
SPEECH LANGUAGE PATHOLOGY EVALUATION    [facility]  OUTPATIENT SPEECH LANGUAGE PATHOLOGY discharge note    05/30/24 0500   Appointment Info   Treating Provider Betty Johnson MA, CCC-SLP   Visits Used 5   Medical Diagnosis Young-onset Parkinson's disease (G20.A1)  - Primary   SLP Tx Diagnosis minimal hypophonia   Progress Note/Certification   Onset Of Illness/injury Or Date Of Surgery 02/08/24   Therapy Frequency x1/wk for 4 weeks, tapering off to 1-2/xmonth   Predicted Duration up to 90 days   Progress Note Due Date 06/02/24   Subjective Report   Subjective Report Tank arrived several min late to treatment session and attended IND. He reports less regular practice of therapy exercises since the last session.  He has noticed that his voice will get raspy at times.   SLP Goals   SLP Goals 1;2   SLP Goal 1   Goal Identifier Breath   Goal Description Patient will increase vocal loudness to an average SPL of >75 dB at 30 cm during sustained /a/ phonation of at least 15 seconds given min cues in order to increase vocal respiratory support required for functional communication.   Target Date 06/02/24   SLP Goal 2   Goal Identifier Loudness   Goal Description Patient will increase vocal loudness during narrative reading and semi-spontaneous conversation tasks to a minimum of  75 dB at 30 cm given min cues in order to improve vocal loudness for daily communication tasks   Target Date 06/02/24   Treatment Interventions (SLP)   Treatment Interventions Treatment Speech/Lang/Voice   Treatment Speech/Lang/Voice   Treatment of Speech, Language, Voice Communication&/or Auditory Processing (37983) 34 Minutes   Speech/Lang/Voice 1 Breath   Speech/Lang/Voice 1 - Details Trained pt in amplitude-based voice exercises. Pt completed 8 repetitions of the amplitude-based voice exercises given min cues/models from SLP.  Pt completed the maximum sustained /a/ with an average 77 dB and 11.3 seconds duration.   "Pt completed the maximum fundamental frequency range tasks (high/low) in range of 138.59 to 196 hz or C#3 to G3 to while maintaining volume >72 dB.   Speech/Lang/Voice 2 Loudness   Speech/Lang/Voice 2 - Details Pt read aloud articles from [employer] with an average 71 dB given min cues to maintain target volume.  \"Off the cuff\" and conversational responses were an average of 70 dB given min cues.   Skilled Intervention Instructed/trained/cued monitored LSVT and hierarchy tasks;Demonstrated voice exercises;Provided feedback on performance of tasks   Patient Response/Progress pt demonstrating interval progress toward goals   Education   Learner/Method Patient;Listening;Demonstration;Pictures/Video   Education Comments Session targets, performance, rationale behind therapy exercises, potential causes of raspy voice quality, HEP and importance of daily practice of therapy exercises   Plan   Home program Daily practice of amplitude-based voice exercises - long ah, high/low ah, 10 functional phrases, reading aloud   Updates to plan of care Follow-up in 4-6 weeks   Plan for next session SLP: amplitude-based voice exercises, 'ah's and 10 functional phrases, tasks targeting narrative reading and conversation   Total Session Time   Total Treatment Time (sum of timed and untimed services) 34         DISCHARGE  Reason for Discharge: Patient has failed to schedule further appointments. Pt did not return following 5/30/2024 session.     Equipment Issued: n/a    Discharge Plan: Patient to continue home program.    Referring Provider:  Louie Fields     Initial assessments: 03/04/2024   Thank you for referring Christiano \"Tank\" Letitia to outpatient speech therapy at [facility].  Please call [phone] or email  Jimmie Avila MS, CCC-SLP at [email or web address] with any questions or concerns.       Jimmie Avila MS, CCC-SLP    Speech-Language " Pathologist

## 2024-12-16 ENCOUNTER — OFFICE VISIT (OUTPATIENT)
Dept: NEUROLOGY | Facility: CLINIC | Age: 49
End: 2024-12-16
Payer: COMMERCIAL

## 2024-12-16 VITALS — OXYGEN SATURATION: 96 % | DIASTOLIC BLOOD PRESSURE: 78 MMHG | HEART RATE: 77 BPM | SYSTOLIC BLOOD PRESSURE: 132 MMHG

## 2024-12-16 DIAGNOSIS — G20.A1 YOUNG-ONSET PARKINSON'S DISEASE (H): Primary | ICD-10-CM

## 2024-12-16 PROCEDURE — 99215 OFFICE O/P EST HI 40 MIN: CPT | Performed by: PSYCHIATRY & NEUROLOGY

## 2024-12-16 PROCEDURE — G2211 COMPLEX E/M VISIT ADD ON: HCPCS | Performed by: PSYCHIATRY & NEUROLOGY

## 2024-12-16 ASSESSMENT — UNIFIED PARKINSONS DISEASE RATING SCALE (UPDRS)
GAIT: (0) NORMAL: NO PROBLEMS.
POSTURAL_STABILITY: (0) NORMAL: NO PROBLEMS. RECOVERS WITH ONE OR TWO STEPS.
AMPLITUDE_RLE: (0) NORMAL: NO TREMOR.
FACIAL_EXPRESSION: (1) SLIGHT: MINIMAL MASKED FACIES MANIFESTED ONLY BY DECREASED FREQUENCY OF BLINKING.
FINGER_TAPPING_LEFT: (1) SLIGHT: ANY OF THE FOLLOWING: A) THE REGULAR RHYTHM IS BROKEN WITH ONE WITH ONE OR TWO INTERRUPTIONS OR HESITATIONS OF THE MOVEMENT  B) SLIGHT SLOWING  C) THE AMPLITUDE DECREMENTS NEAR THE END OF THE 10 MOVEMENTS.
PRONATION_SUPINATION_LEFT: (1) SLIGHT: ANY OF THE FOLLOWING: A) THE REGULAR RHYTHM IS BROKEN WITH ONE WITH ONE OR TWO INTERRUPTIONS OR HESITATIONS OF THE MOVEMENT  B) SLIGHT SLOWING  C) THE AMPLITUDE DECREMENTS NEAR THE END OF THE 10 MOVEMENTS.
AMPLITUDE_LIP_JAW: (0) NORMAL: NO TREMOR.
ARISING_CHAIR: (0) NORMAL: NO PROBLEMS. ABLE TO ARISE QUICKLY WITHOUT HESITATION.
RIGIDITY_LLE: (1) SLIGHT: RIGIDITY ONLY DETECTED WITH ACTIVATION MANEUVER.
MOVEMENTS_INTERFERE_WITH_RATINGS: NO
PRONATION_SUPINATION_RIGHT: (0) NORMAL: NO PROBLEMS.
RIGIDITY_NECK: (0) NORMAL: NO RIGIDITY.
SPEECH: (0) NORMAL: NO SPEECH PROBLEMS.
HANDMOVEMENTS_RIGHT: (0) NORMAL: NO PROBLEMS.
PARKINSONS_MEDS: ON
TOETAPPING_RIGHT: (0) NORMAL: NO PROBLEMS.
TOTAL_SCORE_LEFT: 10
FINGER_TAPPING_RIGHT: (0) NORMAL: NO PROBLEMS.
AMPLITUDE_LUE: (2) MILD: > 1 CM BUT < 3 CM IN MAXIMAL AMPLITUDE.
RIGIDITY_RUE: (0) NORMAL: NO RIGIDITY.
TOETAPPING_LEFT: (1) SLIGHT: ANY OF THE FOLLOWING: A) THE REGULAR RHYTHM IS BROKEN WITH ONE WITH ONE OR TWO INTERRUPTIONS OR HESITATIONS OF THE MOVEMENT  B) SLIGHT SLOWING  C) THE AMPLITUDE DECREMENTS NEAR THE END OF THE 10 MOVEMENTS.
HANDMOVEMENTS_LEFT: (1) SLIGHT: ANY OF THE FOLLOWING: A) THE REGULAR RHYTHM IS BROKEN WITH ONE WITH ONE OR TWO INTERRUPTIONS OR HESITATIONS OF THE MOVEMENT  B) SLIGHT SLOWING  C) THE AMPLITUDE DECREMENTS NEAR THE END OF THE 10 MOVEMENTS.
LEG_AGILITY_RIGHT: (0) NORMAL: NO PROBLEMS.
RIGIDITY_RLE: (0) NORMAL: NO RIGIDITY.
TOTAL_SCORE: 12
AMPLITUDE_LLE: (0) NORMAL: NO TREMOR.
RIGIDITY_LUE: (2) MILD: RIGIDITY DETECTED WITHOUT THE ACTIVATION MANEUVER. FULL RANGE OF MOTION IS EASILY ACHIEVED.
FREEZING_GAIT: (0) NORMAL: NO FREEZING.
POSTURE: (0) NORMAL: NO PROBLEMS.
AXIAL_SCORE: 1
TOTAL_SCORE: 0
AMPLITUDE_RUE: (0) NORMAL: NO TREMOR.
CONSTANCY_TREMOR_ATREST: (1) SLIGHT: TREMOR AT REST IS PRESENT 25% OF THE ENTIRE EXAMINATION PERIOD.
LEG_AGILITY_LEFT: (0) NORMAL: NO PROBLEMS.
SPONTANEITY_OF_MOVEMENT: (0) NORMAL: NO PROBLEMS.
DYSKINESIAS_PRESENT: NO

## 2024-12-16 NOTE — LETTER
2024      Christiano Dong  [address]  [place] [address]      Dear Colleague,    Thank you for referring your patient, Christiano Dong, to the [facility] NEUROLOGY [facility]. Please see a copy of my visit note below.    Department of Neurology  Movement Disorders Division   Return Patient Visit    Patient: Christiano Dong   MRN: [id number]   : 1975   Date of Visit: 2024  PCP: Rodger Baer MD   Referring provider: Adarsh Fields    CC:  Young onset Parkinson disease, follow-up    Interval history:  Mr. Dong is a 49 year old male with history significant for young onset Parkinson disease, tremor subtype who presents to movement disorder clinic for follow-up. Last visit was , with a plan to continue dopamine agonist and reassess.     Overall Tank has done really well.  He denies much of a concern regarding potential symptom progression.  He continues to notice some tremor breakthroughs and scenarios in which he is a little more stressed or anxious about something, and he cites busy days at work as an example.  Outside of that he feels he is doing well.  Even though he has been prescribed 3 tablets of pramipexole 0.25 mg, he is only taking 2 of those 3 times a day.  He denies any wearing off, he denies any side effects to it.  He is 1 of those cases in which he felt great along the up titration case and he stopped because he did not feel he needed to increase the medication further to the initial target dose.  He denies any impulsivity concerns.    No speech or swallowing concerns.  No balance problems or falls.  No cognitive concerns to report.    He continues to exercise regularly he is maintaining healthy life habits.  He had some very pertinent questions regarding use of certain devices to look at the overall data behind in terms of benefit to Parkinson's patients.    Other than that no other issues to report today.      ROS:  All  others negative except as listed above. Pertinent movement disorders-specific ROS listed above.    Past Medical History:   Diagnosis Date     Anxiety         No past surgical history on file.       Current Outpatient Medications:      escitalopram (LEXAPRO) 20 MG tablet, Take 15 mg by mouth daily., Disp: , Rfl:      pramipexole (MIRAPEX) 0.25 MG tablet, Take 3 tablets (0.75 mg) by mouth 3 times daily. (Patient taking differently: Take 0.75 mg by mouth 3 times daily. 2 tablets 3 times daily), Disp: 270 tablet, Rfl: 11     LORazepam (ATIVAN) 0.5 MG tablet, Take 0.5 mg by mouth (Patient not taking: Reported on 12/16/2024), Disp: , Rfl:      No Known Allergies     Family History   Problem Relation Age of Onset     Tremor Father      Multiple Sclerosis Sister      Parkinsonism Paternal Grandfather      Parkinsonism Paternal Uncle      Parkinsonism Cousin         Social History:  He  reports that he has never smoked. He has never used smokeless tobacco. He reports current alcohol use.     PHYSICAL EXAM:  /78   Pulse 77   SpO2 96%      NEURO:  UPDRS  Time:: 1030  Medication: On  R Brain DBS:: None  L Brain DBS:: None  Dyskinesia (LID): No  Did LID interfere: No  Speech: (0) Normal: No speech problems.  Facial Expression: (1) Slight: Minimal masked facies manifested only by decreased frequency of blinking.  Rigidity Neck: (0) Normal: No rigidity.  Rigidity RUE: (0) Normal: No rigidity.  Rigidity LUE: (2) Mild: Rigidity detected without the activation maneuver. Full range of motion is easily achieved.  Rigidity RLE: (0) Normal: No rigidity.  Rigidity LLE: (1) Slight: Rigidity only detected with activation maneuver.  Finger Taps R: (0) Normal: No problems.  Finger Taps L: (1) Slight: Any of the following: a) the regular rhythm is broken with one with one or two interruptions or hesitations of the movement  b) slight slowing  c) the amplitude decrements near the end of the 10 movements.  Hand Mvt R: (0) Normal: No  problems.  Hand Mvt L: (1) Slight: Any of the following: a) the regular rhythm is broken with one with one or two interruptions or hesitations of the movement  b) slight slowing  c) the amplitude decrements near the end of the 10 movements.  Pron-/Supinate R: (0) Normal: No problems.  Pron-/Supinate L: (1) Slight: Any of the following: a) the regular rhythm is broken with one with one or two interruptions or hesitations of the movement  b) slight slowing  c) the amplitude decrements near the end of the 10 movements.  Toe Tap R: (0) Normal: No problems.  Toe Tap L: (1) Slight: Any of the following: a) the regular rhythm is broken with one with one or two interruptions or hesitations of the movement  b) slight slowing  c) the amplitude decrements near the end of the 10 movements.  Leg Agility R: (0) Normal: No problems.  Leg Agility L: (0) Normal: No problems.  Arise From Chair: (0) Normal: No problems. Able to arise quickly without hesitation.  Gait: (0) Normal: No problems.  Gait Freezing: (0) Normal: No freezing.  Postural Stability: (0) Normal: No problems. Recovers with one or two steps.  Posture: (0) Normal: No problems.  Global Spont Mvt: (0) Normal: No problems.  Postural Tremor RUE: (0) Normal: No tremor.  Postural Tremor LUE: (1) Slight: Tremor is present but less than 1 cm in amplitude.  Kinetic Tremor RUE: (0) Normal: No tremor.  Kinetic Tremor LUE: (0) Normal: No tremor.  Rest Tremor RUE: (0) Normal: No tremor.  Rest Tremor LUE: (2) Mild: > 1 cm but < 3 cm in maximal amplitude.  Rest Tremor RLE: (0) Normal: No tremor.  Rest Tremor LLE: (0) Normal: No tremor.  Rest Tremor Lip/Jaw: (0) Normal: No tremor.  Rest Tremor Constancy: (1) Slight: Tremor at rest is present 25% of the entire examination period.  Total Right: 0  Total Left: 10  Axial Total: 1  Total: 12    DATA REVIEWED:  Reviewed pertinent data available in [facility].    ASSESSMENT:  49-year-old male with young onset Parkinson disease, currently stable  on a very low amount of pramipexole.  No clear concerns regarding interval development of symptomatic progression.    PLAN:  -Reviewed impression and plan with patient    - Continue supportive care.    - Continue pramipexole at the current dose of 0.5 mg 3 times a day.  No side effects reported.  He actually inquired towards the other visit whether it would be beneficial if he took the additional tablets towards midnight to try to maintain steady levels, but he is sleeping well, without any concerns regarding overnight wearing off, and he has no reemergence of symptoms first thing in the morning, so we decided to stick to spreading the medication out during the daytime hours when he needs it more, as it is very likely that his dopamine requirement from his brain is not significant enough while he is asleep to justify implementation of medications to during the night.    - He also mentioned that, there are some instances in which his wife is pointing out that he is speech is a little softer and he is mumbling a little more, but this does not seem to be a constant, so he we will continue to observe that moving forward and if we need that to be addressed in the future we can always partner with our speech pathology team for an evaluation and treatment.    - Since he is doing very well, we will plan on regrouping the next 4 to 6 months.  Sooner if needed.  Encouraged to contact us back if any questions or concerns in the meantime.    There are no Patient Instructions on file for this visit.      Louie Duran MD (Leo) (he/him/his)   of Neurology  [facility]  Department of Neurology      Thank you for referring Christiano Dong to our [facility] Movement Disorders Program, we appreciate the opportunity of being your partner in healthcare. Please feel free to reach out to us at any point if any questions or concerns about this visit.    Attending attestation: Today I spent a total 45  minutes on this case, in face-to-face, examining, obtaining history, providing education and coordination of care. Additional time was spent in chart review, ancillary data, and documentation as delineated above.    The longitudinal plan of care for Tank was addressed during this visit. Due to the added complexity in care, I will continue to support Christiano Dong in the subsequent management of this condition(s) and with the ongoing continuity of care of this condition(s).      Again, thank you for allowing me to participate in the care of your patient.        Sincerely,        Louie Fields MD

## 2024-12-16 NOTE — NURSING NOTE
"Christiano Dong is a 49 year old male who presents for:  Chief Complaint   Patient presents with    Follow Up     Follow Up 6 Months Movement          Initial Vitals:  /78   Pulse 77   SpO2 96%  Estimated body mass index is 24.02 kg/m  as calculated from the following:    Height as of 3/15/24: 1.803 m (5' 11\").    Weight as of 6/28/24: 78.1 kg (172 lb 3.2 oz).. There is no height or weight on file to calculate BSA. BP completed using cuff size: regular  Data Unavailable    Nursing Comments:     Dolly Hunt MA   "

## 2024-12-16 NOTE — PROGRESS NOTES
Department of Neurology  Movement Disorders Division   Return Patient Visit    Patient: Christiano Dong   MRN: [id number]   : 1975   Date of Visit: 2024  PCP: Rodger Baer MD   Referring provider: Adarsh Fields    CC:  Young onset Parkinson disease, follow-up    Interval history:  Mr. Dong is a 49 year old male with history significant for young onset Parkinson disease, tremor subtype who presents to movement disorder clinic for follow-up. Last visit was , with a plan to continue dopamine agonist and reassess.     Overall Tank has done really well.  He denies much of a concern regarding potential symptom progression.  He continues to notice some tremor breakthroughs and scenarios in which he is a little more stressed or anxious about something, and he cites busy days at work as an example.  Outside of that he feels he is doing well.  Even though he has been prescribed 3 tablets of pramipexole 0.25 mg, he is only taking 2 of those 3 times a day.  He denies any wearing off, he denies any side effects to it.  He is 1 of those cases in which he felt great along the up titration case and he stopped because he did not feel he needed to increase the medication further to the initial target dose.  He denies any impulsivity concerns.    No speech or swallowing concerns.  No balance problems or falls.  No cognitive concerns to report.    He continues to exercise regularly he is maintaining healthy life habits.  He had some very pertinent questions regarding use of certain devices to look at the overall data behind in terms of benefit to Parkinson's patients.    Other than that no other issues to report today.      ROS:  All others negative except as listed above. Pertinent movement disorders-specific ROS listed above.    Past Medical History:   Diagnosis Date    Anxiety         No past surgical history on file.       Current Outpatient Medications:     escitalopram (LEXAPRO) 20 MG  tablet, Take 15 mg by mouth daily., Disp: , Rfl:     pramipexole (MIRAPEX) 0.25 MG tablet, Take 3 tablets (0.75 mg) by mouth 3 times daily. (Patient taking differently: Take 0.75 mg by mouth 3 times daily. 2 tablets 3 times daily), Disp: 270 tablet, Rfl: 11    LORazepam (ATIVAN) 0.5 MG tablet, Take 0.5 mg by mouth (Patient not taking: Reported on 12/16/2024), Disp: , Rfl:      No Known Allergies     Family History   Problem Relation Age of Onset    Tremor Father     Multiple Sclerosis Sister     Parkinsonism Paternal Grandfather     Parkinsonism Paternal Uncle     Parkinsonism Cousin         Social History:  He  reports that he has never smoked. He has never used smokeless tobacco. He reports current alcohol use.     PHYSICAL EXAM:  /78   Pulse 77   SpO2 96%      NEURO:  UPDRS  Time:: 1030  Medication: On  R Brain DBS:: None  L Brain DBS:: None  Dyskinesia (LID): No  Did LID interfere: No  Speech: (0) Normal: No speech problems.  Facial Expression: (1) Slight: Minimal masked facies manifested only by decreased frequency of blinking.  Rigidity Neck: (0) Normal: No rigidity.  Rigidity RUE: (0) Normal: No rigidity.  Rigidity LUE: (2) Mild: Rigidity detected without the activation maneuver. Full range of motion is easily achieved.  Rigidity RLE: (0) Normal: No rigidity.  Rigidity LLE: (1) Slight: Rigidity only detected with activation maneuver.  Finger Taps R: (0) Normal: No problems.  Finger Taps L: (1) Slight: Any of the following: a) the regular rhythm is broken with one with one or two interruptions or hesitations of the movement  b) slight slowing  c) the amplitude decrements near the end of the 10 movements.  Hand Mvt R: (0) Normal: No problems.  Hand Mvt L: (1) Slight: Any of the following: a) the regular rhythm is broken with one with one or two interruptions or hesitations of the movement  b) slight slowing  c) the amplitude decrements near the end of the 10 movements.  Pron-/Supinate R: (0) Normal: No  problems.  Pron-/Supinate L: (1) Slight: Any of the following: a) the regular rhythm is broken with one with one or two interruptions or hesitations of the movement  b) slight slowing  c) the amplitude decrements near the end of the 10 movements.  Toe Tap R: (0) Normal: No problems.  Toe Tap L: (1) Slight: Any of the following: a) the regular rhythm is broken with one with one or two interruptions or hesitations of the movement  b) slight slowing  c) the amplitude decrements near the end of the 10 movements.  Leg Agility R: (0) Normal: No problems.  Leg Agility L: (0) Normal: No problems.  Arise From Chair: (0) Normal: No problems. Able to arise quickly without hesitation.  Gait: (0) Normal: No problems.  Gait Freezing: (0) Normal: No freezing.  Postural Stability: (0) Normal: No problems. Recovers with one or two steps.  Posture: (0) Normal: No problems.  Global Spont Mvt: (0) Normal: No problems.  Postural Tremor RUE: (0) Normal: No tremor.  Postural Tremor LUE: (1) Slight: Tremor is present but less than 1 cm in amplitude.  Kinetic Tremor RUE: (0) Normal: No tremor.  Kinetic Tremor LUE: (0) Normal: No tremor.  Rest Tremor RUE: (0) Normal: No tremor.  Rest Tremor LUE: (2) Mild: > 1 cm but < 3 cm in maximal amplitude.  Rest Tremor RLE: (0) Normal: No tremor.  Rest Tremor LLE: (0) Normal: No tremor.  Rest Tremor Lip/Jaw: (0) Normal: No tremor.  Rest Tremor Constancy: (1) Slight: Tremor at rest is present 25% of the entire examination period.  Total Right: 0  Total Left: 10  Axial Total: 1  Total: 12    DATA REVIEWED:  Reviewed pertinent data available in [facility].    ASSESSMENT:  49-year-old male with young onset Parkinson disease, currently stable on a very low amount of pramipexole.  No clear concerns regarding interval development of symptomatic progression.    PLAN:  -Reviewed impression and plan with patient    - Continue supportive care.    - Continue pramipexole at the current dose of 0.5 mg 3 times a day.  No  side effects reported.  He actually inquired towards the other visit whether it would be beneficial if he took the additional tablets towards midnight to try to maintain steady levels, but he is sleeping well, without any concerns regarding overnight wearing off, and he has no reemergence of symptoms first thing in the morning, so we decided to stick to spreading the medication out during the daytime hours when he needs it more, as it is very likely that his dopamine requirement from his brain is not significant enough while he is asleep to justify implementation of medications to during the night.    - He also mentioned that, there are some instances in which his wife is pointing out that he is speech is a little softer and he is mumbling a little more, but this does not seem to be a constant, so he we will continue to observe that moving forward and if we need that to be addressed in the future we can always partner with our speech pathology team for an evaluation and treatment.    - Since he is doing very well, we will plan on regrouping the next 4 to 6 months.  Sooner if needed.  Encouraged to contact us back if any questions or concerns in the meantime.    There are no Patient Instructions on file for this visit.      Louie Duran MD (Leo) (he/him/his)   of Neurology  [facility]  Department of Neurology      Thank you for referring Christiano Dong to our [facility] Movement Disorders Program, we appreciate the opportunity of being your partner in healthcare. Please feel free to reach out to us at any point if any questions or concerns about this visit.    Attending attestation: Today I spent a total 45 minutes on this case, in face-to-face, examining, obtaining history, providing education and coordination of care. Additional time was spent in chart review, ancillary data, and documentation as delineated above.    The longitudinal plan of care for Tank was addressed during  this visit. Due to the added complexity in care, I will continue to support Christiano Dong in the subsequent management of this condition(s) and with the ongoing continuity of care of this condition(s).

## 2025-01-04 ENCOUNTER — HEALTH MAINTENANCE LETTER (OUTPATIENT)
Age: 50
End: 2025-01-04

## 2025-04-28 ENCOUNTER — OFFICE VISIT (OUTPATIENT)
Dept: NEUROLOGY | Facility: CLINIC | Age: 50
End: 2025-04-28
Payer: COMMERCIAL

## 2025-04-28 VITALS
HEART RATE: 71 BPM | OXYGEN SATURATION: 100 % | DIASTOLIC BLOOD PRESSURE: 80 MMHG | BODY MASS INDEX: 25.83 KG/M2 | WEIGHT: 185.2 LBS | SYSTOLIC BLOOD PRESSURE: 132 MMHG

## 2025-04-28 DIAGNOSIS — G20.A1 YOUNG-ONSET PARKINSON'S DISEASE (H): Primary | ICD-10-CM

## 2025-04-28 ASSESSMENT — UNIFIED PARKINSONS DISEASE RATING SCALE (UPDRS)
TOETAPPING_RIGHT: (0) NORMAL: NO PROBLEMS.
RIGIDITY_LUE: (1) SLIGHT: RIGIDITY ONLY DETECTED WITH ACTIVATION MANEUVER.
POSTURE: (0) NORMAL: NO PROBLEMS.
AMPLITUDE_LUE: (2) MILD: > 1 CM BUT < 3 CM IN MAXIMAL AMPLITUDE.
GAIT: (1) SLIGHT: INDEPENDENT WALKING WITH MINOR GAIT IMPAIRMENT.
HANDMOVEMENTS_RIGHT: (0) NORMAL: NO PROBLEMS.
FACIAL_EXPRESSION: (1) SLIGHT: MINIMAL MASKED FACIES MANIFESTED ONLY BY DECREASED FREQUENCY OF BLINKING.
AMPLITUDE_LIP_JAW: (0) NORMAL: NO TREMOR.
FINGER_TAPPING_LEFT: (1) SLIGHT: ANY OF THE FOLLOWING: A) THE REGULAR RHYTHM IS BROKEN WITH ONE WITH ONE OR TWO INTERRUPTIONS OR HESITATIONS OF THE MOVEMENT  B) SLIGHT SLOWING  C) THE AMPLITUDE DECREMENTS NEAR THE END OF THE 10 MOVEMENTS.
TOTAL_SCORE: 11
POSTURAL_STABILITY: (0) NORMAL: NO PROBLEMS. RECOVERS WITH ONE OR TWO STEPS.
AMPLITUDE_LLE: (0) NORMAL: NO TREMOR.
LEG_AGILITY_RIGHT: (0) NORMAL: NO PROBLEMS.
AMPLITUDE_RUE: (0) NORMAL: NO TREMOR.
FREEZING_GAIT: (0) NORMAL: NO FREEZING.
FINGER_TAPPING_RIGHT: (0) NORMAL: NO PROBLEMS.
LEG_AGILITY_LEFT: (0) NORMAL: NO PROBLEMS.
SPONTANEITY_OF_MOVEMENT: (0) NORMAL: NO PROBLEMS.
RIGIDITY_RUE: (0) NORMAL: NO RIGIDITY.
TOTAL_SCORE_LEFT: 7
TOETAPPING_LEFT: (1) SLIGHT: ANY OF THE FOLLOWING: A) THE REGULAR RHYTHM IS BROKEN WITH ONE WITH ONE OR TWO INTERRUPTIONS OR HESITATIONS OF THE MOVEMENT  B) SLIGHT SLOWING  C) THE AMPLITUDE DECREMENTS NEAR THE END OF THE 10 MOVEMENTS.
PRONATION_SUPINATION_RIGHT: (0) NORMAL: NO PROBLEMS.
AMPLITUDE_RLE: (0) NORMAL: NO TREMOR.
PRONATION_SUPINATION_LEFT: (1) SLIGHT: ANY OF THE FOLLOWING: A) THE REGULAR RHYTHM IS BROKEN WITH ONE WITH ONE OR TWO INTERRUPTIONS OR HESITATIONS OF THE MOVEMENT  B) SLIGHT SLOWING  C) THE AMPLITUDE DECREMENTS NEAR THE END OF THE 10 MOVEMENTS.
RIGIDITY_RLE: (1) SLIGHT: RIGIDITY ONLY DETECTED WITH ACTIVATION MANEUVER.
RIGIDITY_NECK: (0) NORMAL: NO RIGIDITY.
TOTAL_SCORE: 1
CONSTANCY_TREMOR_ATREST: (1) SLIGHT: TREMOR AT REST IS PRESENT 25% OF THE ENTIRE EXAMINATION PERIOD.
RIGIDITY_LLE: (0) NORMAL: NO RIGIDITY.
AXIAL_SCORE: 2
ARISING_CHAIR: (0) NORMAL: NO PROBLEMS. ABLE TO ARISE QUICKLY WITHOUT HESITATION.
SPEECH: (0) NORMAL: NO SPEECH PROBLEMS.
DYSKINESIAS_PRESENT: NO
PARKINSONS_MEDS: ON
HANDMOVEMENTS_LEFT: (1) SLIGHT: ANY OF THE FOLLOWING: A) THE REGULAR RHYTHM IS BROKEN WITH ONE WITH ONE OR TWO INTERRUPTIONS OR HESITATIONS OF THE MOVEMENT  B) SLIGHT SLOWING  C) THE AMPLITUDE DECREMENTS NEAR THE END OF THE 10 MOVEMENTS.

## 2025-04-28 NOTE — NURSING NOTE
"Christiano Dong is a 49 year old male who presents for:  Chief Complaint   Patient presents with    Follow Up     *Pt states he is running 5 mins late for the 8:45 arrival time, will be there before 9am, patient was informed of late policy -      Follow Up 6 Months Movement           Initial Vitals:  /80   Pulse 71   Wt 84 kg (185 lb 3.2 oz)   SpO2 100%   BMI 25.83 kg/m   Estimated body mass index is 25.83 kg/m  as calculated from the following:    Height as of 3/15/24: 1.803 m (5' 11\").    Weight as of this encounter: 84 kg (185 lb 3.2 oz).. Body surface area is 2.05 meters squared. BP completed using cuff size: regular  Data Unavailable    Nursing Comments:     Dolly Hunt MA   "

## 2025-04-28 NOTE — PROGRESS NOTES
Department of Neurology  Movement Disorders Division   Return Patient Visit    Patient: Christiano Dong   MRN: [id number]   : 1975   Date of Visit: 2025  PCP: Rodger Baer MD   Referring provider: Adarsh Fields    CC:  Young onset Parkinson disease, follow-up    Interval history:  Mr. Dong is a 49 year old male with history significant for young onset Parkinson disease, tremor subtype who presents to movement disorder clinic for follow-up. Last visit was 2024, with a plan to continue dopamine agonist and reassess.     The patient reports that things are largely doing well. Has noted a bit of voice slurring. His tremor is largely doing well without bothering him. Balance is going well without significant issues with walking. Swallowing is going well without any issues. Memory and thinking are going well. No hallucinations. No lightheadedness. The patient has been tolerating the medications but does note a bit more off period in the morning. No signs of dopa dysregulation syndrome.       Related Medications   6 AM Noon   Pramipexole 0.25 mg 3 3                              ROS:  All others negative except as listed above. Pertinent movement disorders-specific ROS listed above.    Past Medical History:   Diagnosis Date    Anxiety         No past surgical history on file.       Current Outpatient Medications:     escitalopram (LEXAPRO) 20 MG tablet, Take 15 mg by mouth daily., Disp: , Rfl:     pramipexole (MIRAPEX) 0.25 MG tablet, Take 3 tablets (0.75 mg) by mouth 3 times daily. (Patient taking differently: Take 0.75 mg by mouth 3 times daily. 2 tablets 3 times daily), Disp: 270 tablet, Rfl: 11    LORazepam (ATIVAN) 0.5 MG tablet, Take 0.5 mg by mouth (Patient not taking: Reported on 2024), Disp: , Rfl:      No Known Allergies     Family History   Problem Relation Age of Onset    Tremor Father     Multiple Sclerosis Sister     Parkinsonism Paternal Grandfather      Parkinsonism Paternal Uncle     Parkinsonism Cousin         Social History:  He  reports that he has never smoked. He has never used smokeless tobacco. He reports current alcohol use.     PHYSICAL EXAM:  /80   Pulse 71   Wt 84 kg (185 lb 3.2 oz)   SpO2 100%   BMI 25.83 kg/m       NEURO:      12/16/2024    11:00 AM 4/28/2025     9:00 AM   UPDRS Motor Scale   Time: 10:30 09:18   Medication On On   R Brain DBS: None None   L Brain DBS: None None   Dyskinesia (LID) No No   Did LID interfere No    Speech 0 0   Facial Expression 1 1   Rigidity Neck 0 0   Rigidity RUE 0 0   Rigidity LUE 2 1   Rigidity RLE 0 1   Rigidity LLE 1 0   Finger Taps R 0 0   Finger Taps L 1 1   Hand Mvt R 0 0   Hand Mvt L 1 1   Pron-/Supinate R 0 0   Pron-/Supinate L 1 1   Toe Tap R 0 0   Toe Tap L 1 1   Leg Agility R 0 0   Leg Agility L 0 0   Arise From Chair 0 0   Gait 0 1   Gait Freezing 0 0   Postural Stability 0 0   Posture 0 0   Global Spont Mvt 0 0   Postural Tremor RUE 0 0   Postural Tremor LUE 1 0   Kinetic Tremor RUE 0 0   Kinetic Tremor LUE 0 0   Rest Tremor RUE 0 0   Rest Tremor LUE 2 2   Rest Tremor RLE 0 0   Rest Tremor LLE 0 0   Rest Tremor Lip/Jaw 0 0   Rest Tremor Constancy 1 1   Total Right 0 1   Total Left 10 7   Axial Total 1 2   Total 12 11     DATA REVIEWED:  Reviewed pertinent data available in epic.    ASSESSMENT:  49 year old male with young onset Parkinson disease, currently stable on a very low amount of pramipexole.  No clear concerns regarding interval development of symptomatic progression. The patient reported some off period in the morning which is likely partially due to medication timing. We discussed that we could try ER pramipexole if he is bothered by this. The patient would like to hold off on changes at this point which is reasonable. We discussed new treatment options on the horizon, clinical trials and the value of supplements. Also discussed that he could consider genetic testing in the future if  he is interested.     PLAN:  - No changes at this stage, could consider pramipexole ER in the future.   - RTC in 6 months    Related Medications   6 AM Noon   Pramipexole 0.25 mg 3 3                              Patient seen and discussed with Dr. Renee Loja MD  Neuromodulation/Movement Disorders Fellow

## 2025-04-28 NOTE — LETTER
2025      Christiano Dong  [address]  [facility] [address]      Dear Colleague,    Thank you for referring your patient, Christiano Dong, to the [facility]. Please see a copy of my visit note below.    Department of Neurology  Movement Disorders Division   Return Patient Visit    Patient: Christiano Dong   MRN: [id number]   : 1975   Date of Visit: 2025  PCP: Rodger Baer MD   Referring provider: Adarsh Fields    CC:  Young onset Parkinson disease, follow-up    Interval history:  Mr. Dong is a 49 year old male with history significant for young onset Parkinson disease, tremor subtype who presents to movement disorder clinic for follow-up. Last visit was 2024, with a plan to continue dopamine agonist and reassess.     The patient reports that things are largely doing well. Has noted a bit of voice slurring. His tremor is largely doing well without bothering him. Balance is going well without significant issues with walking. Swallowing is going well without any issues. Memory and thinking are going well. No hallucinations. No lightheadedness. The patient has been tolerating the medications but does note a bit more off period in the morning. No signs of dopa dysregulation syndrome.       Related Medications   6 AM Noon   Pramipexole 0.25 mg 3 3                              ROS:  All others negative except as listed above. Pertinent movement disorders-specific ROS listed above.    Past Medical History:   Diagnosis Date     Anxiety         No past surgical history on file.       Current Outpatient Medications:      escitalopram (LEXAPRO) 20 MG tablet, Take 15 mg by mouth daily., Disp: , Rfl:      pramipexole (MIRAPEX) 0.25 MG tablet, Take 3 tablets (0.75 mg) by mouth 3 times daily. (Patient taking differently: Take 0.75 mg by mouth 3 times daily. 2 tablets 3 times daily), Disp: 270 tablet, Rfl: 11     LORazepam (ATIVAN)  0.5 MG tablet, Take 0.5 mg by mouth (Patient not taking: Reported on 6/28/2024), Disp: , Rfl:      No Known Allergies     Family History   Problem Relation Age of Onset     Tremor Father      Multiple Sclerosis Sister      Parkinsonism Paternal Grandfather      Parkinsonism Paternal Uncle      Parkinsonism Cousin         Social History:  He  reports that he has never smoked. He has never used smokeless tobacco. He reports current alcohol use.     PHYSICAL EXAM:  /80   Pulse 71   Wt 84 kg (185 lb 3.2 oz)   SpO2 100%   BMI 25.83 kg/m       NEURO:      12/16/2024    11:00 AM 4/28/2025     9:00 AM   UPDRS Motor Scale   Time: 10:30 09:18   Medication On On   R Brain DBS: None None   L Brain DBS: None None   Dyskinesia (LID) No No   Did LID interfere No    Speech 0 0   Facial Expression 1 1   Rigidity Neck 0 0   Rigidity RUE 0 0   Rigidity LUE 2 1   Rigidity RLE 0 1   Rigidity LLE 1 0   Finger Taps R 0 0   Finger Taps L 1 1   Hand Mvt R 0 0   Hand Mvt L 1 1   Pron-/Supinate R 0 0   Pron-/Supinate L 1 1   Toe Tap R 0 0   Toe Tap L 1 1   Leg Agility R 0 0   Leg Agility L 0 0   Arise From Chair 0 0   Gait 0 1   Gait Freezing 0 0   Postural Stability 0 0   Posture 0 0   Global Spont Mvt 0 0   Postural Tremor RUE 0 0   Postural Tremor LUE 1 0   Kinetic Tremor RUE 0 0   Kinetic Tremor LUE 0 0   Rest Tremor RUE 0 0   Rest Tremor LUE 2 2   Rest Tremor RLE 0 0   Rest Tremor LLE 0 0   Rest Tremor Lip/Jaw 0 0   Rest Tremor Constancy 1 1   Total Right 0 1   Total Left 10 7   Axial Total 1 2   Total 12 11     DATA REVIEWED:  Reviewed pertinent data available in epic.    ASSESSMENT:  49 year old male with young onset Parkinson disease, currently stable on a very low amount of pramipexole.  No clear concerns regarding interval development of symptomatic progression. The patient reported some off period in the morning which is likely partially due to medication timing. We discussed that we could try ER pramipexole if he is  bothered by this. The patient would like to hold off on changes at this point which is reasonable. We discussed new treatment options on the horizon, clinical trials and the value of supplements. Also discussed that he could consider genetic testing in the future if he is interested.     PLAN:  - No changes at this stage, could consider pramipexole ER in the future.   - RTC in 6 months    Related Medications   6 AM Noon   Pramipexole 0.25 mg 3 3                              Patient seen and discussed with Dr. Renee Loja MD  Neuromodulation/Movement Disorders Fellow            Attestation signed by Louie Aguirre MD at 5/6/2025  8:27 AM:  Attestation entered as a separate note.      I, Louie Fields MD, personally interviewed, examined and evaluated this patient. I personally reviewed the vital signs, medications and pertinent labs/imaging. I discussed the patient with Dr Loja and agree with the assessment, examination and plan of care documented, with the additions and/or exceptions delineated below:    Tank is doing well, denies any interval concerns pertaining possible disease progression.  Still has some residual tremors, which look exactly the same from last time.  He is exercising regularly, maintaining healthy life habits.    No changes to be made today.  Next visit in 6 months.  Sooner if needed.  Encouraged to contact us back if any questions or concerns.    The longitudinal plan of care for Tank was addressed during this visit. Due to the added complexity in care, I will continue to support Christiano Dong in the subsequent management of this condition(s) and with the ongoing continuity of care of this condition(s).    Attending attestation: Today a total of 30 minutes were spent on this case, in face-to-face, examining, obtaining history, providing education and coordination of care. Additional time was spent in chart review, ancillary data, and documentation as  delineated above.      Again, thank you for allowing me to participate in the care of your patient.        Sincerely,        Louie Fields MD    Electronically signed

## 2025-05-06 NOTE — PROGRESS NOTES
I, Louie Fields MD, personally interviewed, examined and evaluated this patient. I personally reviewed the vital signs, medications and pertinent labs/imaging. I discussed the patient with Dr Loja and agree with the assessment, examination and plan of care documented, with the additions and/or exceptions delineated below:    Tank is doing well, denies any interval concerns pertaining possible disease progression.  Still has some residual tremors, which look exactly the same from last time.  He is exercising regularly, maintaining healthy life habits.    No changes to be made today.  Next visit in 6 months.  Sooner if needed.  Encouraged to contact us back if any questions or concerns.    The longitudinal plan of care for Tank was addressed during this visit. Due to the added complexity in care, I will continue to support Christiano Dong in the subsequent management of this condition(s) and with the ongoing continuity of care of this condition(s).    Attending attestation: Today a total of 30 minutes were spent on this case, in face-to-face, examining, obtaining history, providing education and coordination of care. Additional time was spent in chart review, ancillary data, and documentation as delineated above.